# Patient Record
Sex: MALE | Race: WHITE | Employment: FULL TIME | URBAN - METROPOLITAN AREA
[De-identification: names, ages, dates, MRNs, and addresses within clinical notes are randomized per-mention and may not be internally consistent; named-entity substitution may affect disease eponyms.]

---

## 2018-07-08 ENCOUNTER — APPOINTMENT (OUTPATIENT)
Dept: ULTRASOUND IMAGING | Age: 38
End: 2018-07-08
Attending: EMERGENCY MEDICINE
Payer: COMMERCIAL

## 2018-07-08 ENCOUNTER — HOSPITAL ENCOUNTER (EMERGENCY)
Age: 38
Discharge: HOME | End: 2018-07-08
Attending: EMERGENCY MEDICINE
Payer: COMMERCIAL

## 2018-07-08 VITALS
SYSTOLIC BLOOD PRESSURE: 163 MMHG | RESPIRATION RATE: 20 BRPM | OXYGEN SATURATION: 96 % | TEMPERATURE: 98.5 F | WEIGHT: 203.93 LBS | DIASTOLIC BLOOD PRESSURE: 98 MMHG | HEART RATE: 89 BPM

## 2018-07-08 DIAGNOSIS — N45.1 EPIDIDYMITIS: Primary | ICD-10-CM

## 2018-07-08 PROCEDURE — 74011250637 HC RX REV CODE- 250/637: Performed by: EMERGENCY MEDICINE

## 2018-07-08 PROCEDURE — 96372 THER/PROPH/DIAG INJ SC/IM: CPT

## 2018-07-08 PROCEDURE — 74011000250 HC RX REV CODE- 250: Performed by: EMERGENCY MEDICINE

## 2018-07-08 PROCEDURE — 74011250636 HC RX REV CODE- 250/636: Performed by: EMERGENCY MEDICINE

## 2018-07-08 PROCEDURE — 99283 EMERGENCY DEPT VISIT LOW MDM: CPT

## 2018-07-08 PROCEDURE — 76870 US EXAM SCROTUM: CPT

## 2018-07-08 RX ORDER — OXYCODONE AND ACETAMINOPHEN 7.5; 325 MG/1; MG/1
1 TABLET ORAL
Status: COMPLETED | OUTPATIENT
Start: 2018-07-08 | End: 2018-07-08

## 2018-07-08 RX ORDER — OXYCODONE AND ACETAMINOPHEN 5; 325 MG/1; MG/1
1 TABLET ORAL
Qty: 10 TAB | Refills: 0 | Status: SHIPPED | OUTPATIENT
Start: 2018-07-08 | End: 2018-11-25

## 2018-07-08 RX ORDER — DOXYCYCLINE HYCLATE 100 MG
100 TABLET ORAL 2 TIMES DAILY
Qty: 20 TAB | Refills: 0 | Status: SHIPPED | OUTPATIENT
Start: 2018-07-08 | End: 2018-07-18

## 2018-07-08 RX ORDER — IBUPROFEN 800 MG/1
800 TABLET ORAL
Qty: 20 TAB | Refills: 0 | Status: SHIPPED | OUTPATIENT
Start: 2018-07-08 | End: 2018-07-15

## 2018-07-08 RX ORDER — KETOROLAC TROMETHAMINE 30 MG/ML
60 INJECTION, SOLUTION INTRAMUSCULAR; INTRAVENOUS ONCE
Status: COMPLETED | OUTPATIENT
Start: 2018-07-08 | End: 2018-07-08

## 2018-07-08 RX ADMIN — OXYCODONE HYDROCHLORIDE AND ACETAMINOPHEN 1 TABLET: 7.5; 325 TABLET ORAL at 23:14

## 2018-07-08 RX ADMIN — LIDOCAINE HYDROCHLORIDE 250 MG: 10 INJECTION, SOLUTION EPIDURAL; INFILTRATION; INTRACAUDAL; PERINEURAL at 23:15

## 2018-07-08 RX ADMIN — OXYCODONE HYDROCHLORIDE AND ACETAMINOPHEN 1 TABLET: 7.5; 325 TABLET ORAL at 22:11

## 2018-07-08 RX ADMIN — KETOROLAC TROMETHAMINE 60 MG: 30 INJECTION, SOLUTION INTRAMUSCULAR at 21:20

## 2018-07-08 NOTE — LETTER
[facility] EMERGENCY DEPT 
[address] [facility] [address] [id number] 
[phone] Work/School Note Date: 7/8/2018 To Whom It May concern: 
 
Florinda Don was seen and treated today in the emergency room by the following provider(s): 
Attending Provider: Nora Villegas MD. Florinda Don may return to work on 7/11/18. Sincerely, Nora Villegas MD

## 2018-07-09 NOTE — ED PROVIDER NOTES
HPI Comments: Hx renal transplant; presents with a one day hx of right testicular pain and swelling; first noted pain last night; lasted about an hour and resolved; today pain has been constant and he has noted swelling; pain is moderate; ice without relief. No dysuria. He has been constipated which is unusual for him. He denies a hx of similar testicular complaints. Good PO intake. Patient is a 40 y.o. male presenting with testicular pain. Testicle Pain             History reviewed. No pertinent past medical history. Past Surgical History:   Procedure Laterality Date    HX RENAL TRANSPLANT           History reviewed. No pertinent family history. Social History     Social History    Marital status: LEGALLY      Spouse name: N/A    Number of children: N/A    Years of education: N/A     Occupational History    Not on file. Social History Main Topics    Smoking status: Never Smoker    Smokeless tobacco: Never Used    Alcohol use Not on file    Drug use: Not on file    Sexual activity: Not on file     Other Topics Concern    Not on file     Social History Narrative    No narrative on file         ALLERGIES: Review of patient's allergies indicates no known allergies. Review of Systems   Genitourinary: Positive for testicular pain. All other systems reviewed and are negative. Vitals:    07/08/18 2055   BP: (!) 170/113   Pulse: 98   Resp: 20   Temp: 98.5 °F (36.9 °C)   SpO2: 99%   Weight: 92.5 kg (203 lb 14.8 oz)            Physical Exam   Constitutional: He appears well-developed and well-nourished. HENT:   Head: Normocephalic and atraumatic. Eyes: Conjunctivae are normal. No scleral icterus. Neck: No JVD present. No tracheal deviation present. Cardiovascular: Normal rate. Pulmonary/Chest: Effort normal.   Abdominal: He exhibits no distension. Genitourinary:   Genitourinary Comments: Swollen, tender left scrotum and underlying tissue. No testicular lie. Musculoskeletal: He exhibits no edema. Neurological: He is alert. oriented   Skin: No rash noted. No pallor. Psychiatric: He has a normal mood and affect. [facility]      ED Course       Procedures    Progress Note:  Results, treatment, and follow up plan have been discussed with patient/girlfriend  Questions were answered. Chelsea King MD  11:06 PM    A/P: epididymitis - Rocephin/Doxy; Percocet/Motrin for pain; PCP/urology f/u.   Chelsea King MD  11:10 PM

## 2018-07-09 NOTE — ED NOTES
Patient stated that his pain is terrible and that the last pill did not help. Asked him to void and he said that he cannot.

## 2018-07-09 NOTE — ED NOTES
Discharge note: The patient was discharged home in stable condition, accompanied by friend. The patient is alert and oriented, is in no respiratory distress and has vital signs within normal limits. The patient's diagnosis, condition and treatment were explained to patient by Dr Anya Kahn. The patient  expressed understanding of discharge instructions, prescriptions, and plan of care. A work note was given to pt. A discharge plan has been developed. A  was not involved in the process. Patient offered a wheelchair to ED lobby for discharge but declined at this time. Patient ambulatory with a steady gate to ED lobby to go home with friend.

## 2018-07-09 NOTE — ED NOTES
Patient's pain has intensified since returning from the [address]. Wife has asked for more pain medication. Will inform Dr Morris Pedraza.

## 2018-07-09 NOTE — ED TRIAGE NOTES
Patient complains of right side testicular swelling and pain that started yesterday. Patient reports pain and swelling increasing into right side of groin.

## 2018-07-09 NOTE — ED NOTES
Bedside shift change report given to Landon Camacho RN (oncoming nurse) by KAREN ADAM (offgoing nurse). Report included the following information SBAR.

## 2018-07-09 NOTE — DISCHARGE INSTRUCTIONS
Epididymitis and Orchitis: Care Instructions  Your Care Instructions    Epididymitis is pain and swelling of the tube that is attached to each testicle. This tube is called the epididymis. Orchitis is pain and swelling of the testicle. Infection with bacteria often causes these conditions. Sexually transmitted infections (STIs) also can cause both conditions. This is often the case in men younger than 28. Other causes in boys and older men are infections from surgery or having a catheter that drains urine. The mumps virus also can cause orchitis. Anti-inflammatory or pain medicines can help with the pain. Antibiotics are used if the problem is caused by bacteria. They are not used if a virus is the cause. Your testicle may stay swollen for many days or even a few weeks. The doctor has checked you carefully, but problems can develop later. If you notice any problems or new symptoms, get medical treatment right away. Follow-up care is a key part of your treatment and safety. Be sure to make and go to all appointments, and call your doctor if you are having problems. It's also a good idea to know your test results and keep a list of the medicines you take. How can you care for yourself at home? · If your doctor prescribed antibiotics, take them as directed. Do not stop taking them just because you feel better. You need to take the full course of antibiotics. · Ask your doctor if you can take an over-the-counter pain medicine, such as acetaminophen (Tylenol), ibuprofen (Advil, Motrin), or naproxen (Aleve). Be safe with medicines. Read and follow all instructions on the label. · Limit your activity to what is comfortable. · Wear snug underwear or an athletic supporter. This can help reduce pain. · Apply either cold or heat to the swollen area. Use the one that works best for your pain. Sitting in a warm bath for 15 minutes twice a day will help reduce the swelling more quickly.   · If you have been told that an STI may have caused your condition, do not have sex until your doctor says it is safe. This will prevent spreading the infection. Tell your sex partner or partners that they need to be checked. They may need treatment. When should you call for help? Call your doctor now or seek immediate medical care if:  ? · Your pain gets worse. ? · You have a new or higher fever. ? · You have new or more swelling of your testicle. ? · You have new belly pain, or your pain gets worse. ? Watch closely for changes in your health, and be sure to contact your doctor if:  ? · You do not get better as expected. Where can you learn more? Go to http://renate-alexa.info/. Enter S360 in the search box to learn more about \"Epididymitis and Orchitis: Care Instructions. \"  Current as of: May 12, 2017  Content Version: 11.4  © 5189-8120 [employer]. Care instructions adapted under license by [employer] (which disclaims liability or warranty for this information). If you have questions about a medical condition or this instruction, always ask your healthcare professional. [address] any warranty or liability for your use of this information.

## 2018-09-01 ENCOUNTER — HOSPITAL ENCOUNTER (EMERGENCY)
Age: 38
Discharge: HOME | End: 2018-09-01
Attending: EMERGENCY MEDICINE | Admitting: EMERGENCY MEDICINE
Payer: COMMERCIAL

## 2018-09-01 ENCOUNTER — APPOINTMENT (OUTPATIENT)
Dept: CT IMAGING | Age: 38
End: 2018-09-01
Attending: EMERGENCY MEDICINE
Payer: COMMERCIAL

## 2018-09-01 VITALS
OXYGEN SATURATION: 100 % | WEIGHT: 204.81 LBS | RESPIRATION RATE: 15 BRPM | SYSTOLIC BLOOD PRESSURE: 132 MMHG | HEART RATE: 85 BPM | HEIGHT: 66 IN | TEMPERATURE: 97.9 F | DIASTOLIC BLOOD PRESSURE: 91 MMHG | BODY MASS INDEX: 32.92 KG/M2

## 2018-09-01 DIAGNOSIS — K59.00 CONSTIPATION, UNSPECIFIED CONSTIPATION TYPE: ICD-10-CM

## 2018-09-01 DIAGNOSIS — R10.9 RIGHT FLANK PAIN: Primary | ICD-10-CM

## 2018-09-01 LAB
ALBUMIN SERPL-MCNC: 3.6 G/DL (ref 3.5–5)
ALBUMIN/GLOB SERPL: 1.1 {RATIO} (ref 1.1–2.2)
ALP SERPL-CCNC: 47 U/L (ref 45–117)
ALT SERPL-CCNC: 49 U/L (ref 12–78)
ANION GAP SERPL CALC-SCNC: 9 MMOL/L (ref 5–15)
APPEARANCE UR: CLEAR
AST SERPL-CCNC: 22 U/L (ref 15–37)
BACTERIA URNS QL MICRO: NEGATIVE /HPF
BASOPHILS # BLD: 0 K/UL (ref 0–0.1)
BASOPHILS NFR BLD: 1 % (ref 0–1)
BILIRUB SERPL-MCNC: 0.4 MG/DL (ref 0.2–1)
BILIRUB UR QL: NEGATIVE
BUN SERPL-MCNC: 14 MG/DL (ref 6–20)
BUN/CREAT SERPL: 11 (ref 12–20)
CALCIUM SERPL-MCNC: 8.8 MG/DL (ref 8.5–10.1)
CHLORIDE SERPL-SCNC: 103 MMOL/L (ref 97–108)
CO2 SERPL-SCNC: 29 MMOL/L (ref 21–32)
COLOR UR: ABNORMAL
CREAT SERPL-MCNC: 1.25 MG/DL (ref 0.7–1.3)
DIFFERENTIAL METHOD BLD: NORMAL
EOSINOPHIL # BLD: 0.3 K/UL (ref 0–0.4)
EOSINOPHIL NFR BLD: 5 % (ref 0–7)
EPITH CASTS URNS QL MICRO: NORMAL /LPF
ERYTHROCYTE [DISTWIDTH] IN BLOOD BY AUTOMATED COUNT: 11.8 % (ref 11.5–14.5)
GLOBULIN SER CALC-MCNC: 3.2 G/DL (ref 2–4)
GLUCOSE SERPL-MCNC: 96 MG/DL (ref 65–100)
GLUCOSE UR STRIP.AUTO-MCNC: NEGATIVE MG/DL
HCT VFR BLD AUTO: 44.4 % (ref 36.6–50.3)
HGB BLD-MCNC: 15.6 G/DL (ref 12.1–17)
HGB UR QL STRIP: ABNORMAL
KETONES UR QL STRIP.AUTO: NEGATIVE MG/DL
LEUKOCYTE ESTERASE UR QL STRIP.AUTO: NEGATIVE
LYMPHOCYTES # BLD: 1.8 K/UL
LYMPHOCYTES NFR BLD: 27 % (ref 12–49)
MCH RBC QN AUTO: 33 PG (ref 26–34)
MCHC RBC AUTO-ENTMCNC: 35.1 G/DL (ref 30–36.5)
MCV RBC AUTO: 93.9 FL (ref 80–99)
MONOCYTES # BLD: 0.6 K/UL (ref 0–1)
MONOCYTES NFR BLD: 9 % (ref 5–13)
NEUTS SEG # BLD: 3.9 K/UL (ref 1.8–8)
NEUTS SEG NFR BLD: 58 % (ref 32–75)
NITRITE UR QL STRIP.AUTO: NEGATIVE
PH UR STRIP: 6 [PH] (ref 5–8)
PLATELET # BLD AUTO: 236 K/UL (ref 150–400)
PMV BLD AUTO: 9 FL (ref 8.9–12.9)
POTASSIUM SERPL-SCNC: 4.1 MMOL/L (ref 3.5–5.1)
PROT SERPL-MCNC: 6.8 G/DL (ref 6.4–8.2)
PROT UR STRIP-MCNC: NEGATIVE MG/DL
RBC # BLD AUTO: 4.73 M/UL (ref 4.1–5.7)
RBC #/AREA URNS HPF: NORMAL /HPF (ref 0–5)
SODIUM SERPL-SCNC: 141 MMOL/L (ref 136–145)
SP GR UR REFRACTOMETRY: 1.02 (ref 1–1.03)
UROBILINOGEN UR QL STRIP.AUTO: 0.2 EU/DL (ref 0.2–1)
WBC # BLD AUTO: 6.6 K/UL (ref 4.1–11.1)
WBC URNS QL MICRO: NORMAL /HPF (ref 0–4)
XXWBCSUS: 0

## 2018-09-01 PROCEDURE — 99283 EMERGENCY DEPT VISIT LOW MDM: CPT

## 2018-09-01 PROCEDURE — 85025 COMPLETE CBC W/AUTO DIFF WBC: CPT | Performed by: EMERGENCY MEDICINE

## 2018-09-01 PROCEDURE — 80053 COMPREHEN METABOLIC PANEL: CPT | Performed by: EMERGENCY MEDICINE

## 2018-09-01 PROCEDURE — 74011250636 HC RX REV CODE- 250/636: Performed by: EMERGENCY MEDICINE

## 2018-09-01 PROCEDURE — 81001 URINALYSIS AUTO W/SCOPE: CPT | Performed by: EMERGENCY MEDICINE

## 2018-09-01 PROCEDURE — 74176 CT ABD & PELVIS W/O CONTRAST: CPT

## 2018-09-01 PROCEDURE — 36415 COLL VENOUS BLD VENIPUNCTURE: CPT | Performed by: EMERGENCY MEDICINE

## 2018-09-01 RX ORDER — POLYETHYLENE GLYCOL 3350 17 G/17G
17 POWDER, FOR SOLUTION ORAL DAILY
Qty: 238 G | Refills: 0 | Status: SHIPPED | OUTPATIENT
Start: 2018-09-01 | End: 2018-11-25

## 2018-09-01 RX ADMIN — SODIUM CHLORIDE 1000 ML: 900 INJECTION, SOLUTION INTRAVENOUS at 01:40

## 2018-09-01 NOTE — ED PROVIDER NOTES
HPI Comments:  
66-year-old male with history of kidney stones presents with complaints of 2-1/2 hours right flank pain radiating into right groin associated with nausea. Denies trauma. Feel similar to kidney stone in past. Denies fever, chills. Associated with recent constipation. Pain started with bowel movement this evening. Denies urinary complaints, hematuria, dysuria, urinary frequency/urgency. No pain medications taken prior to arrival. Patient currently has only one kidney, left kidney donated. Denies drug use, tobacco use Daily alcohol use No primary care physician Has seen at [facility] urology in past for kidney stones The history is provided by the patient. History reviewed. No pertinent past medical history. Past Surgical History:  
Procedure Laterality Date  HX RENAL TRANSPLANT History reviewed. No pertinent family history. Social History Social History  Marital status: LEGALLY  Spouse name: N/A  
 Number of children: N/A  
 Years of education: N/A Occupational History  Not on file. Social History Main Topics  Smoking status: Never Smoker  Smokeless tobacco: Never Used  Alcohol use Not on file  Drug use: Not on file  Sexual activity: Not on file Other Topics Concern  Not on file Social History Narrative ALLERGIES: Review of patient's allergies indicates no known allergies. Review of Systems Constitutional: Negative for chills and fever. HENT: Negative for congestion, nosebleeds and sore throat. Eyes: Negative for pain and discharge. Respiratory: Negative for cough and shortness of breath. Cardiovascular: Negative for chest pain and palpitations. Gastrointestinal: Positive for constipation and nausea. Negative for abdominal pain and vomiting. Genitourinary: Positive for flank pain. Negative for decreased urine volume, dysuria and urgency. Musculoskeletal: Negative for gait problem and myalgias. Skin: Negative for rash and wound. Neurological: Negative for seizures and syncope. Hematological: Does not bruise/bleed easily. Psychiatric/Behavioral: Negative for confusion, self-injury and suicidal ideas. Vitals:  
 09/01/18 0124 BP: (!) 156/92 Pulse: 97 Resp: 16 Temp: 97.9 °F (36.6 °C) SpO2: 98% Weight: 92.9 kg (204 lb 12.9 oz) Height: 5' 6\" (1.676 m) Physical Exam  
Constitutional: He is oriented to person, place, and time. He appears well-developed and well-nourished. HENT:  
Head: Normocephalic and atraumatic. Eyes: EOM are normal. Pupils are equal, round, and reactive to light. Neck: Normal range of motion. Neck supple. Cardiovascular: Normal rate, regular rhythm, normal heart sounds and intact distal pulses. Pulmonary/Chest: Effort normal and breath sounds normal. No respiratory distress. He has no wheezes. Abdominal: Soft. Bowel sounds are normal. There is no tenderness. There is no rebound and no guarding. Hernia confirmed negative in the right inguinal area and confirmed negative in the left inguinal area. Genitourinary: Penis normal. Right testis shows tenderness. Right testis shows no mass and no swelling. Right testis is descended. Left testis shows no mass, no swelling and no tenderness. Left testis is descended. Circumcised. No penile tenderness. Musculoskeletal: Normal range of motion. Neurological: He is alert and oriented to person, place, and time. Skin: Skin is warm and dry. Psychiatric: He has a normal mood and affect. His behavior is normal.  
Nursing note and vitals reviewed. MDM Number of Diagnoses or Management Options Constipation, unspecified constipation type:  
Right flank pain:  
Diagnosis management comments: 43-year-old male with history of single kidney, kidney stones presents with complaints of right flank pain associated with nausea  is similar to stone pain in past. Also complains of constipation. Patient appears mildly uncomfortable, right flank tenderness to palpation, right sided groin tenderness to palpation, right scrotal mild tenderness. Plan-CBC/CMP, CT abdomen pelvis, and fluid hydration. Declines pain medication at this time. Labs and CT unremarkable Amount and/or Complexity of Data Reviewed Clinical lab tests: ordered and reviewed Tests in the radiology section of CPT®: ordered and reviewed Independent visualization of images, tracings, or specimens: yes Patient Progress Patient progress: improved ED Course Procedures

## 2018-09-01 NOTE — DISCHARGE INSTRUCTIONS
Abdominal Pain: Care Instructions  Your Care Instructions    Abdominal pain has many possible causes. Some aren't serious and get better on their own in a few days. Others need more testing and treatment. If your pain continues or gets worse, you need to be rechecked and may need more tests to find out what is wrong. You may need surgery to correct the problem. Don't ignore new symptoms, such as fever, nausea and vomiting, urination problems, pain that gets worse, and dizziness. These may be signs of a more serious problem. Your doctor may have recommended a follow-up visit in the next 8 to 12 hours. If you are not getting better, you may need more tests or treatment. The doctor has checked you carefully, but problems can develop later. If you notice any problems or new symptoms, get medical treatment right away. Follow-up care is a key part of your treatment and safety. Be sure to make and go to all appointments, and call your doctor if you are having problems. It's also a good idea to know your test results and keep a list of the medicines you take. How can you care for yourself at home? · Rest until you feel better. · To prevent dehydration, drink plenty of fluids, enough so that your urine is light yellow or clear like water. Choose water and other caffeine-free clear liquids until you feel better. If you have kidney, heart, or liver disease and have to limit fluids, talk with your doctor before you increase the amount of fluids you drink. · If your stomach is upset, eat mild foods, such as rice, dry toast or crackers, bananas, and applesauce. Try eating several small meals instead of two or three large ones. · Wait until 48 hours after all symptoms have gone away before you have spicy foods, alcohol, and drinks that contain caffeine. · Do not eat foods that are high in fat. · Avoid anti-inflammatory medicines such as aspirin, ibuprofen (Advil, Motrin), and naproxen (Aleve).  These can cause stomach upset. Talk to your doctor if you take daily aspirin for another health problem. When should you call for help? Call 911 anytime you think you may need emergency care. For example, call if:    · You passed out (lost consciousness).     · You pass maroon or very bloody stools.     · You vomit blood or what looks like coffee grounds.     · You have new, severe belly pain.    Call your doctor now or seek immediate medical care if:    · Your pain gets worse, especially if it becomes focused in one area of your belly.     · You have a new or higher fever.     · Your stools are black and look like tar, or they have streaks of blood.     · You have unexpected vaginal bleeding.     · You have symptoms of a urinary tract infection. These may include:  ¨ Pain when you urinate. ¨ Urinating more often than usual.  ¨ Blood in your urine.     · You are dizzy or lightheaded, or you feel like you may faint.    Watch closely for changes in your health, and be sure to contact your doctor if:    · You are not getting better after 1 day (24 hours). Where can you learn more? Go to http://renaterSmartalexa.info/. Enter A263 in the search box to learn more about \"Abdominal Pain: Care Instructions. \"  Current as of: November 20, 2017  Content Version: 11.7  © 2742-0744 [employer]. Care instructions adapted under license by [employer] (which disclaims liability or warranty for this information). If you have questions about a medical condition or this instruction, always ask your healthcare professional. [place] [address] any warranty or liability for your use of this information. Constipation: Care Instructions  Your Care Instructions    Constipation means that you have a hard time passing stools (bowel movements). People pass stools from 3 times a day to once every 3 days. What is normal for you may be different.  Constipation may occur with pain in the rectum and cramping. The pain may get worse when you try to pass stools. Sometimes there are small amounts of bright red blood on toilet paper or the surface of stools. This is because of enlarged veins near the rectum (hemorrhoids). A few changes in your diet and lifestyle may help you avoid ongoing constipation. Your doctor may also prescribe medicine to help loosen your stool. Some medicines can cause constipation. These include pain medicines and antidepressants. Tell your doctor about all the medicines you take. Your doctor may want to make a medicine change to ease your symptoms. Follow-up care is a key part of your treatment and safety. Be sure to make and go to all appointments, and call your doctor if you are having problems. It's also a good idea to know your test results and keep a list of the medicines you take. How can you care for yourself at home? · Drink plenty of fluids, enough so that your urine is light yellow or clear like water. If you have kidney, heart, or liver disease and have to limit fluids, talk with your doctor before you increase the amount of fluids you drink. · Include high-fiber foods in your diet each day. These include fruits, vegetables, beans, and whole grains. · Get at least 30 minutes of exercise on most days of the week. Walking is a good choice. You also may want to do other activities, such as running, swimming, cycling, or playing tennis or team sports. · Take a fiber supplement, such as Citrucel or Metamucil, every day. Read and follow all instructions on the label. · Schedule time each day for a bowel movement. A daily routine may help. Take your time having your bowel movement. · Support your feet with a small step stool when you sit on the toilet. This helps flex your hips and places your pelvis in a squatting position. · Your doctor may recommend an over-the-counter laxative to relieve your constipation. Examples are Milk of Magnesia and MiraLax.  Read and follow all instructions on the label. Do not use laxatives on a long-term basis. When should you call for help? Call your doctor now or seek immediate medical care if:    · You have new or worse belly pain.     · You have new or worse nausea or vomiting.     · You have blood in your stools.    Watch closely for changes in your health, and be sure to contact your doctor if:    · Your constipation is getting worse.     · You do not get better as expected. Where can you learn more? Go to http://renate-alexa.info/. Enter 21 [id number] in the search box to learn more about \"Constipation: Care Instructions. \"  Current as of: November 20, 2017  Content Version: 11.7  © 5714-7411 [employer]. Care instructions adapted under license by [employer] (which disclaims liability or warranty for this information). If you have questions about a medical condition or this instruction, always ask your healthcare professional. [place] [address] any warranty or liability for your use of this information.

## 2018-10-08 ENCOUNTER — HOSPITAL ENCOUNTER (EMERGENCY)
Age: 38
Discharge: HOME | End: 2018-10-08
Attending: EMERGENCY MEDICINE
Payer: COMMERCIAL

## 2018-10-08 ENCOUNTER — APPOINTMENT (OUTPATIENT)
Dept: GENERAL RADIOLOGY | Age: 38
End: 2018-10-08
Attending: EMERGENCY MEDICINE
Payer: COMMERCIAL

## 2018-10-08 VITALS
HEIGHT: 67 IN | WEIGHT: 200 LBS | HEART RATE: 91 BPM | OXYGEN SATURATION: 100 % | DIASTOLIC BLOOD PRESSURE: 104 MMHG | TEMPERATURE: 97.7 F | RESPIRATION RATE: 16 BRPM | BODY MASS INDEX: 31.39 KG/M2 | SYSTOLIC BLOOD PRESSURE: 136 MMHG

## 2018-10-08 DIAGNOSIS — R10.31 RLQ ABDOMINAL PAIN: Primary | ICD-10-CM

## 2018-10-08 LAB
ALBUMIN SERPL-MCNC: 4.3 G/DL (ref 3.5–5)
ALBUMIN/GLOB SERPL: 1.2 {RATIO} (ref 1.1–2.2)
ALP SERPL-CCNC: 57 U/L (ref 45–117)
ALT SERPL-CCNC: 54 U/L (ref 12–78)
ANION GAP SERPL CALC-SCNC: 6 MMOL/L (ref 5–15)
APPEARANCE UR: CLEAR
AST SERPL-CCNC: 33 U/L (ref 15–37)
BACTERIA URNS QL MICRO: NEGATIVE /HPF
BASOPHILS # BLD: 0 K/UL (ref 0–0.1)
BASOPHILS NFR BLD: 1 % (ref 0–1)
BILIRUB SERPL-MCNC: 0.6 MG/DL (ref 0.2–1)
BILIRUB UR QL: NEGATIVE
BUN SERPL-MCNC: 19 MG/DL (ref 6–20)
BUN/CREAT SERPL: 17 (ref 12–20)
CALCIUM SERPL-MCNC: 9 MG/DL (ref 8.5–10.1)
CHLORIDE SERPL-SCNC: 103 MMOL/L (ref 97–108)
CO2 SERPL-SCNC: 31 MMOL/L (ref 21–32)
COLOR UR: ABNORMAL
COMMENT, HOLDF: NORMAL
CREAT SERPL-MCNC: 1.13 MG/DL (ref 0.7–1.3)
DIFFERENTIAL METHOD BLD: ABNORMAL
EOSINOPHIL # BLD: 0.2 K/UL (ref 0–0.4)
EOSINOPHIL NFR BLD: 3 % (ref 0–7)
EPITH CASTS URNS QL MICRO: ABNORMAL /LPF
ERYTHROCYTE [DISTWIDTH] IN BLOOD BY AUTOMATED COUNT: 11.4 % (ref 11.5–14.5)
GLOBULIN SER CALC-MCNC: 3.6 G/DL (ref 2–4)
GLUCOSE SERPL-MCNC: 103 MG/DL (ref 65–100)
GLUCOSE UR STRIP.AUTO-MCNC: NEGATIVE MG/DL
HCT VFR BLD AUTO: 44.1 % (ref 36.6–50.3)
HGB BLD-MCNC: 15.3 G/DL (ref 12.1–17)
HGB UR QL STRIP: ABNORMAL
HYALINE CASTS URNS QL MICRO: ABNORMAL /LPF (ref 0–5)
IMM GRANULOCYTES # BLD: 0 K/UL (ref 0–0.04)
IMM GRANULOCYTES NFR BLD AUTO: 1 % (ref 0–0.5)
KETONES UR QL STRIP.AUTO: NEGATIVE MG/DL
LEUKOCYTE ESTERASE UR QL STRIP.AUTO: NEGATIVE
LIPASE SERPL-CCNC: 175 U/L (ref 73–393)
LYMPHOCYTES # BLD: 1.7 K/UL (ref 0.8–3.5)
LYMPHOCYTES NFR BLD: 19 % (ref 12–49)
MCH RBC QN AUTO: 32 PG (ref 26–34)
MCHC RBC AUTO-ENTMCNC: 34.7 G/DL (ref 30–36.5)
MCV RBC AUTO: 92.3 FL (ref 80–99)
MONOCYTES # BLD: 0.5 K/UL (ref 0–1)
MONOCYTES NFR BLD: 5 % (ref 5–13)
NEUTS SEG # BLD: 6.4 K/UL (ref 1.8–8)
NEUTS SEG NFR BLD: 72 % (ref 32–75)
NITRITE UR QL STRIP.AUTO: NEGATIVE
NRBC # BLD: 0 K/UL (ref 0–0.01)
NRBC BLD-RTO: 0 PER 100 WBC
PH UR STRIP: 5 [PH] (ref 5–8)
PLATELET # BLD AUTO: 281 K/UL (ref 150–400)
PMV BLD AUTO: 8.8 FL (ref 8.9–12.9)
POTASSIUM SERPL-SCNC: 4.2 MMOL/L (ref 3.5–5.1)
PROT SERPL-MCNC: 7.9 G/DL (ref 6.4–8.2)
PROT UR STRIP-MCNC: NEGATIVE MG/DL
RBC # BLD AUTO: 4.78 M/UL (ref 4.1–5.7)
RBC #/AREA URNS HPF: ABNORMAL /HPF (ref 0–5)
SAMPLES BEING HELD,HOLD: NORMAL
SODIUM SERPL-SCNC: 140 MMOL/L (ref 136–145)
SP GR UR REFRACTOMETRY: 1.03 (ref 1–1.03)
UR CULT HOLD, URHOLD: NORMAL
UROBILINOGEN UR QL STRIP.AUTO: 0.2 EU/DL (ref 0.2–1)
WBC # BLD AUTO: 8.9 K/UL (ref 4.1–11.1)
WBC URNS QL MICRO: ABNORMAL /HPF (ref 0–4)

## 2018-10-08 PROCEDURE — 36415 COLL VENOUS BLD VENIPUNCTURE: CPT | Performed by: EMERGENCY MEDICINE

## 2018-10-08 PROCEDURE — 85025 COMPLETE CBC W/AUTO DIFF WBC: CPT | Performed by: EMERGENCY MEDICINE

## 2018-10-08 PROCEDURE — 96361 HYDRATE IV INFUSION ADD-ON: CPT

## 2018-10-08 PROCEDURE — 81001 URINALYSIS AUTO W/SCOPE: CPT | Performed by: EMERGENCY MEDICINE

## 2018-10-08 PROCEDURE — 99283 EMERGENCY DEPT VISIT LOW MDM: CPT

## 2018-10-08 PROCEDURE — 96360 HYDRATION IV INFUSION INIT: CPT

## 2018-10-08 PROCEDURE — 73502 X-RAY EXAM HIP UNI 2-3 VIEWS: CPT

## 2018-10-08 PROCEDURE — 83690 ASSAY OF LIPASE: CPT | Performed by: EMERGENCY MEDICINE

## 2018-10-08 PROCEDURE — 74011250636 HC RX REV CODE- 250/636: Performed by: EMERGENCY MEDICINE

## 2018-10-08 PROCEDURE — 80053 COMPREHEN METABOLIC PANEL: CPT | Performed by: EMERGENCY MEDICINE

## 2018-10-08 PROCEDURE — 74019 RADEX ABDOMEN 2 VIEWS: CPT

## 2018-10-08 RX ORDER — SODIUM CHLORIDE 0.9 % (FLUSH) 0.9 %
5-10 SYRINGE (ML) INJECTION AS NEEDED
Status: DISCONTINUED | OUTPATIENT
Start: 2018-10-08 | End: 2018-10-08 | Stop reason: HOSPADM

## 2018-10-08 RX ORDER — SODIUM CHLORIDE 0.9 % (FLUSH) 0.9 %
5-10 SYRINGE (ML) INJECTION EVERY 8 HOURS
Status: DISCONTINUED | OUTPATIENT
Start: 2018-10-08 | End: 2018-10-08 | Stop reason: HOSPADM

## 2018-10-08 RX ADMIN — SODIUM CHLORIDE 1000 ML: 900 INJECTION, SOLUTION INTRAVENOUS at 06:00

## 2018-10-08 NOTE — ED PROVIDER NOTES
HPI Comments: Patient arrives for complaints of RLQ abd pain that radiates to right lower back. Patient states this happens after attempting to have bowel movements for 4-5 months. Patient has not followed up with PCP but rather had other ER visits in which he was told he was constipated. Patient is a 40 y.o. male presenting with abdominal pain. The history is provided by the patient. No  was used. Abdominal Pain This is a recurrent problem. The current episode started 1 to 2 hours ago. The problem occurs every several days. The problem has been gradually improving. Associated with: bowel movements. The pain is located in the RLQ. The quality of the pain is sharp. The pain is at a severity of 4/10. Associated symptoms include back pain. Pertinent negatives include no fever, no vomiting, no dysuria, no hematuria, no myalgias and no chest pain. The pain is worsened by palpation and bowel movements. Relieved by: rest. Past workup includes CT scan. His past medical history does not include PUD, GERD, UTI or DM. History reviewed. No pertinent past medical history. Past Surgical History:  
Procedure Laterality Date  HX RENAL TRANSPLANT  HX UROLOGICAL    
 left kidney removed  - donated  HX VASECTOMY History reviewed. No pertinent family history. Social History Social History  Marital status: LEGALLY  Spouse name: N/A  
 Number of children: N/A  
 Years of education: N/A Occupational History  Not on file. Social History Main Topics  Smoking status: Never Smoker  Smokeless tobacco: Never Used  Alcohol use 21.6 oz/week 36 Cans of beer per week  Drug use: No  
 Sexual activity: Not on file Other Topics Concern  Not on file Social History Narrative ALLERGIES: Review of patient's allergies indicates no known allergies. Review of Systems Constitutional: Negative for appetite change, chills, fever and unexpected weight change. HENT: Negative for ear pain, hearing loss, rhinorrhea and trouble swallowing. Eyes: Negative for pain and visual disturbance. Respiratory: Negative for cough, chest tightness and shortness of breath. Cardiovascular: Negative for chest pain and palpitations. Gastrointestinal: Positive for abdominal pain. Negative for abdominal distention, blood in stool and vomiting. Genitourinary: Negative for dysuria, hematuria and urgency. Musculoskeletal: Positive for back pain. Negative for myalgias. Skin: Negative for rash. Neurological: Negative for dizziness, syncope, weakness and numbness. Psychiatric/Behavioral: Negative for confusion and suicidal ideas. All other systems reviewed and are negative. Vitals:  
 10/08/18 0544 BP: (!) 150/94 Pulse: 91  
Resp: 16 Temp: 97.7 °F (36.5 °C) SpO2: 100% Weight: 90.7 kg (200 lb) Height: 5' 7\" (1.702 m) Physical Exam  
Constitutional: He is oriented to person, place, and time. He appears well-developed and well-nourished. No distress. HENT:  
Head: Normocephalic and atraumatic. Right Ear: External ear normal.  
Left Ear: External ear normal.  
Nose: Nose normal.  
Mouth/Throat: Oropharynx is clear and moist. No oropharyngeal exudate. Eyes: Conjunctivae and EOM are normal. Pupils are equal, round, and reactive to light. Right eye exhibits no discharge. Left eye exhibits no discharge. No scleral icterus. Neck: Normal range of motion. Neck supple. No JVD present. No tracheal deviation present. Cardiovascular: Normal rate, regular rhythm, normal heart sounds and intact distal pulses. Exam reveals no gallop and no friction rub. No murmur heard. Pulmonary/Chest: Effort normal and breath sounds normal. No stridor. No respiratory distress. He has no decreased breath sounds.  He has no wheezes. He has no rhonchi. He has no rales. He exhibits no tenderness. Abdominal: Soft. Bowel sounds are normal. He exhibits no distension. There is tenderness (mild) in the right lower quadrant. There is no rebound, no guarding and no tenderness at McBurney's point. Hernia confirmed negative in the left inguinal area. Genitourinary: Testes normal and penis normal.  
 
 
Cremasteric reflex is present. Right testis shows no mass, no swelling and no tenderness. Right testis is descended. Cremasteric reflex is not absent on the right side. Left testis shows no mass, no swelling and no tenderness. Left testis is descended. Cremasteric reflex is not absent on the left side. Circumcised. Musculoskeletal: Normal range of motion. He exhibits no edema. Right hip: He exhibits tenderness. Legs: 
Neurological: He is alert and oriented to person, place, and time. He has normal strength and normal reflexes. No cranial nerve deficit or sensory deficit. He exhibits normal muscle tone. Coordination normal. GCS eye subscore is 4. GCS verbal subscore is 5. GCS motor subscore is 6. Skin: Skin is warm and dry. No rash noted. He is not diaphoretic. No erythema. No pallor. Psychiatric: He has a normal mood and affect. His behavior is normal. Judgment and thought content normal.  
Nursing note and vitals reviewed. MDM Number of Diagnoses or Management Options Amount and/or Complexity of Data Reviewed Clinical lab tests: ordered and reviewed Tests in the radiology section of CPT®: ordered and reviewed Risk of Complications, Morbidity, and/or Mortality Presenting problems: moderate Diagnostic procedures: low Management options: moderate Patient Progress Patient progress: stable ED Course Procedures Chief Complaint Patient presents with  Abdominal Pain  Back Pain The patient's presenting problems have been discussed, and they are in agreement with the care plan formulated and outlined with them. I have encouraged them to ask questions as they arise throughout their visit. MEDICATIONS GIVEN: 
Medications  
sodium chloride (NS) flush 5-10 mL (not administered)  
sodium chloride (NS) flush 5-10 mL (not administered)  
sodium chloride 0.9 % bolus infusion 1,000 mL (1,000 mL IntraVENous New Bag 10/8/18 0600) LABS REVIEWED: 
Recent Results (from the past 24 hour(s)) CBC WITH AUTOMATED DIFF Collection Time: 10/08/18  5:58 AM  
Result Value Ref Range WBC 8.9 4.1 - 11.1 K/uL  
 RBC 4.78 4.10 - 5.70 M/uL  
 HGB 15.3 12.1 - 17.0 g/dL HCT 44.1 36.6 - 50.3 % MCV 92.3 80.0 - 99.0 FL  
 MCH 32.0 26.0 - 34.0 PG  
 MCHC 34.7 30.0 - 36.5 g/dL  
 RDW 11.4 (L) 11.5 - 14.5 % PLATELET 535 998 - 090 K/uL MPV 8.8 (L) 8.9 - 12.9 FL  
 NRBC 0.0 0  WBC ABSOLUTE NRBC 0.00 0.00 - 0.01 K/uL NEUTROPHILS 72 32 - 75 % LYMPHOCYTES 19 12 - 49 % MONOCYTES 5 5 - 13 % EOSINOPHILS 3 0 - 7 % BASOPHILS 1 0 - 1 % IMMATURE GRANULOCYTES 1 (H) 0.0 - 0.5 % ABS. NEUTROPHILS 6.4 1.8 - 8.0 K/UL  
 ABS. LYMPHOCYTES 1.7 0.8 - 3.5 K/UL  
 ABS. MONOCYTES 0.5 0.0 - 1.0 K/UL  
 ABS. EOSINOPHILS 0.2 0.0 - 0.4 K/UL  
 ABS. BASOPHILS 0.0 0.0 - 0.1 K/UL  
 ABS. IMM. GRANS. 0.0 0.00 - 0.04 K/UL  
 DF AUTOMATED METABOLIC PANEL, COMPREHENSIVE Collection Time: 10/08/18  5:58 AM  
Result Value Ref Range Sodium 140 136 - 145 mmol/L Potassium 4.2 3.5 - 5.1 mmol/L Chloride 103 97 - 108 mmol/L  
 CO2 31 21 - 32 mmol/L Anion gap 6 5 - 15 mmol/L Glucose 103 (H) 65 - 100 mg/dL BUN 19 6 - 20 MG/DL Creatinine 1.13 0.70 - 1.30 MG/DL  
 BUN/Creatinine ratio 17 12 - 20 GFR est AA >60 >60 ml/min/1.73m2 GFR est non-AA >60 >60 ml/min/1.73m2 Calcium 9.0 8.5 - 10.1 MG/DL Bilirubin, total 0.6 0.2 - 1.0 MG/DL  
 ALT (SGPT) 54 12 - 78 U/L  
 AST (SGOT) 33 15 - 37 U/L Alk.  phosphatase 57 45 - 117 U/L  
 Protein, total 7.9 6.4 - 8.2 g/dL Albumin 4.3 3.5 - 5.0 g/dL Globulin 3.6 2.0 - 4.0 g/dL A-G Ratio 1.2 1.1 - 2.2 LIPASE Collection Time: 10/08/18  5:58 AM  
Result Value Ref Range Lipase 175 73 - 393 U/L  
URINALYSIS W/MICROSCOPIC Collection Time: 10/08/18  5:58 AM  
Result Value Ref Range Color YELLOW/STRAW Appearance CLEAR CLEAR Specific gravity 1.026 1.003 - 1.030    
 pH (UA) 5.0 5.0 - 8.0 Protein NEGATIVE  NEG mg/dL Glucose NEGATIVE  NEG mg/dL Ketone NEGATIVE  NEG mg/dL Bilirubin NEGATIVE  NEG Blood TRACE (A) NEG Urobilinogen 0.2 0.2 - 1.0 EU/dL Nitrites NEGATIVE  NEG Leukocyte Esterase NEGATIVE  NEG    
 WBC 0-4 0 - 4 /hpf  
 RBC 0-5 0 - 5 /hpf Epithelial cells FEW FEW /lpf Bacteria NEGATIVE  NEG /hpf Hyaline cast 0-2 0 - 5 /lpf URINE CULTURE HOLD SAMPLE Collection Time: 10/08/18  5:58 AM  
Result Value Ref Range Urine culture hold URINE ON HOLD IN MICROBIOLOGY DEPT FOR 3 DAYS. IF UNPRESERVED URINE IS SUBMITTED, IT CANNOT BE USED FOR ADDITIONAL TESTING AFTER 24 HRS, RECOLLECTION WILL BE REQUIRED. SAMPLES BEING HELD Collection Time: 10/08/18  5:58 AM  
Result Value Ref Range SAMPLES BEING HELD 1GRN,1BLU,1SST,1RED COMMENT Add-on orders for these samples will be processed based on acceptable specimen integrity and analyte stability, which may vary by analyte. VITAL SIGNS: 
Patient Vitals for the past 12 hrs: 
 Temp Pulse Resp BP SpO2  
10/08/18 0544 97.7 °F (36.5 °C) 91 16 (!) 150/94 100 % RADIOLOGY RESULTS: 
The following have been ordered and reviewed: 
No results found. PROGRESS NOTES: 
7:08 AM 
Change of shift. Care of patient signed over to Dr. Ayesha Ford. Bedside handoff complete. DIAGNOSIS: 
 
1. RLQ abdominal pain PLAN: 
Final disposition by oncoming physician. ED COURSE: The patient's hospital course has been uncomplicated.

## 2018-10-08 NOTE — ED NOTES
Bedside and Verbal shift change report given to Eloisa Hartman (oncoming nurse) by Dorinda Comer (offgoing nurse). Report included the following information SBAR, ED Summary, MAR and Recent Results.

## 2018-10-08 NOTE — ED TRIAGE NOTES
Patient arrives for complaints of RLQ abd pain that radiates to right lower back. Patient states this happens after attempting to have bowel movements for 4-5 months. Patient has not followed up with PCP but rather had other ER visits in which he was told he was constipated.

## 2018-10-08 NOTE — LETTER
[facility] EMERGENCY DEPT 
[address] [place] [address] [id number] [phone] Work/School Note Date: 10/8/2018 To Whom It May concern: 
 
Amaris Hope was seen and treated today in the emergency room by the following provider(s): 
Attending Provider: Marti Elizondo DO & Rin Hardy MD   
  
Amaris Hope may return to work on 10/9/2018.  
 
Sincerely, 
 
Rin Hardy MD

## 2018-10-08 NOTE — DISCHARGE INSTRUCTIONS

## 2018-11-25 ENCOUNTER — APPOINTMENT (OUTPATIENT)
Dept: CT IMAGING | Age: 38
DRG: 864 | End: 2018-11-25
Attending: EMERGENCY MEDICINE
Payer: COMMERCIAL

## 2018-11-25 ENCOUNTER — HOSPITAL ENCOUNTER (INPATIENT)
Age: 38
LOS: 4 days | Discharge: HOME | DRG: 864 | End: 2018-11-29
Attending: EMERGENCY MEDICINE | Admitting: HOSPITALIST
Payer: COMMERCIAL

## 2018-11-25 ENCOUNTER — APPOINTMENT (OUTPATIENT)
Dept: GENERAL RADIOLOGY | Age: 38
DRG: 864 | End: 2018-11-25
Attending: EMERGENCY MEDICINE
Payer: COMMERCIAL

## 2018-11-25 DIAGNOSIS — R50.9 FEVER, UNKNOWN ORIGIN: Primary | ICD-10-CM

## 2018-11-25 PROBLEM — R10.9 ABDOMINAL PAIN: Status: ACTIVE | Noted: 2018-11-25

## 2018-11-25 LAB
ALBUMIN SERPL-MCNC: 3.7 G/DL (ref 3.5–5)
ALBUMIN/GLOB SERPL: 1 {RATIO} (ref 1.1–2.2)
ALP SERPL-CCNC: 47 U/L (ref 45–117)
ALT SERPL-CCNC: 60 U/L (ref 12–78)
AMPHET UR QL SCN: POSITIVE
ANION GAP SERPL CALC-SCNC: 9 MMOL/L (ref 5–15)
APPEARANCE CSF: CLEAR
APPEARANCE UR: CLEAR
APTT PPP: 29.9 SEC (ref 22.1–32)
AST SERPL-CCNC: 40 U/L (ref 15–37)
BACTERIA URNS QL MICRO: NEGATIVE /HPF
BARBITURATES UR QL SCN: NEGATIVE
BASOPHILS # BLD: 0 K/UL (ref 0–0.1)
BASOPHILS NFR BLD: 0 % (ref 0–1)
BENZODIAZ UR QL: NEGATIVE
BILIRUB SERPL-MCNC: 0.9 MG/DL (ref 0.2–1)
BILIRUB UR QL: NEGATIVE
BUN SERPL-MCNC: 11 MG/DL (ref 6–20)
BUN/CREAT SERPL: 10 (ref 12–20)
CALCIUM SERPL-MCNC: 8.5 MG/DL (ref 8.5–10.1)
CANNABINOIDS UR QL SCN: NEGATIVE
CHLORIDE SERPL-SCNC: 103 MMOL/L (ref 97–108)
CK SERPL-CCNC: 139 U/L (ref 39–308)
CO2 SERPL-SCNC: 25 MMOL/L (ref 21–32)
COCAINE UR QL SCN: NEGATIVE
COLOR CSF: COLORLESS
COLOR SPUN CSF: COLORLESS
COLOR UR: ABNORMAL
COMMENT, HOLDF: NORMAL
COMMENT, HOLDF: NORMAL
CREAT SERPL-MCNC: 1.09 MG/DL (ref 0.7–1.3)
CRYPTOC AG CSF QL IA: NEGATIVE
DIFFERENTIAL METHOD BLD: ABNORMAL
DRUG SCRN COMMENT,DRGCM: ABNORMAL
EOSINOPHIL # BLD: 0 K/UL (ref 0–0.4)
EOSINOPHIL NFR BLD: 0 % (ref 0–7)
EPITH CASTS URNS QL MICRO: ABNORMAL /LPF
ERYTHROCYTE [DISTWIDTH] IN BLOOD BY AUTOMATED COUNT: 11.3 % (ref 11.5–14.5)
ERYTHROCYTE [SEDIMENTATION RATE] IN BLOOD: 4 MM/HR (ref 0–15)
GLOBULIN SER CALC-MCNC: 3.7 G/DL (ref 2–4)
GLUCOSE BLD STRIP.AUTO-MCNC: 87 MG/DL (ref 65–100)
GLUCOSE CSF-MCNC: 55 MG/DL (ref 40–70)
GLUCOSE SERPL-MCNC: 80 MG/DL (ref 65–100)
GLUCOSE UR STRIP.AUTO-MCNC: NEGATIVE MG/DL
HCT VFR BLD AUTO: 48.4 % (ref 36.6–50.3)
HGB BLD-MCNC: 16.7 G/DL (ref 12.1–17)
HGB UR QL STRIP: ABNORMAL
HIV 1+2 AB+HIV1 P24 AG SERPL QL IA: NONREACTIVE
HIV12 RESULT COMMENT, HHIVC: NORMAL
HYALINE CASTS URNS QL MICRO: ABNORMAL /LPF (ref 0–5)
IMM GRANULOCYTES # BLD: 0 K/UL (ref 0–0.04)
IMM GRANULOCYTES NFR BLD AUTO: 0 % (ref 0–0.5)
INR PPP: 1 (ref 0.9–1.1)
KETONES UR QL STRIP.AUTO: NEGATIVE MG/DL
LACTATE SERPL-SCNC: 1.6 MMOL/L (ref 0.4–2)
LEUKOCYTE ESTERASE UR QL STRIP.AUTO: NEGATIVE
LYMPHOCYTES # BLD: 0.9 K/UL (ref 0.8–3.5)
LYMPHOCYTES NFR BLD: 21 % (ref 12–49)
MCH RBC QN AUTO: 32.1 PG (ref 26–34)
MCHC RBC AUTO-ENTMCNC: 34.5 G/DL (ref 30–36.5)
MCV RBC AUTO: 92.9 FL (ref 80–99)
METHADONE UR QL: NEGATIVE
MONOCYTES # BLD: 0.4 K/UL (ref 0–1)
MONOCYTES NFR BLD: 9 % (ref 5–13)
NEUTS SEG # BLD: 3.1 K/UL (ref 1.8–8)
NEUTS SEG NFR BLD: 70 % (ref 32–75)
NITRITE UR QL STRIP.AUTO: NEGATIVE
NRBC # BLD: 0 K/UL (ref 0–0.01)
NRBC BLD-RTO: 0 PER 100 WBC
OPIATES UR QL: POSITIVE
PCP UR QL: NEGATIVE
PH UR STRIP: 6.5 [PH] (ref 5–8)
PLATELET # BLD AUTO: 143 K/UL (ref 150–400)
PMV BLD AUTO: 8.9 FL (ref 8.9–12.9)
POTASSIUM SERPL-SCNC: 4.4 MMOL/L (ref 3.5–5.1)
PROT CSF-MCNC: 37 MG/DL (ref 15–45)
PROT SERPL-MCNC: 7.4 G/DL (ref 6.4–8.2)
PROT UR STRIP-MCNC: NEGATIVE MG/DL
PROTHROMBIN TIME: 10.4 SEC (ref 9–11.1)
RBC # BLD AUTO: 5.21 M/UL (ref 4.1–5.7)
RBC # CSF: 1 /CU MM
RBC #/AREA URNS HPF: ABNORMAL /HPF (ref 0–5)
SAMPLES BEING HELD,HOLD: NORMAL
SAMPLES BEING HELD,HOLD: NORMAL
SERVICE CMNT-IMP: NORMAL
SODIUM SERPL-SCNC: 137 MMOL/L (ref 136–145)
SP GR UR REFRACTOMETRY: 1.02 (ref 1–1.03)
THERAPEUTIC RANGE,PTTT: NORMAL SECS (ref 58–77)
TROPONIN I SERPL-MCNC: <0.05 NG/ML
TUBE # CSF: 1
TUBE # CSF: 1
TUBE # CSF: 3
UROBILINOGEN UR QL STRIP.AUTO: 0.2 EU/DL (ref 0.2–1)
WBC # BLD AUTO: 4.5 K/UL (ref 4.1–11.1)
WBC # CSF: 1 /CU MM (ref 0–5)
WBC URNS QL MICRO: ABNORMAL /HPF (ref 0–4)

## 2018-11-25 PROCEDURE — 87389 HIV-1 AG W/HIV-1&-2 AB AG IA: CPT

## 2018-11-25 PROCEDURE — 74011636320 HC RX REV CODE- 636/320: Performed by: RADIOLOGY

## 2018-11-25 PROCEDURE — 87070 CULTURE OTHR SPECIMN AEROBIC: CPT

## 2018-11-25 PROCEDURE — 82550 ASSAY OF CK (CPK): CPT

## 2018-11-25 PROCEDURE — 87498 ENTEROVIRUS PROBE&REVRS TRNS: CPT

## 2018-11-25 PROCEDURE — 74177 CT ABD & PELVIS W/CONTRAST: CPT

## 2018-11-25 PROCEDURE — 87327 CRYPTOCOCCUS NEOFORM AG IA: CPT

## 2018-11-25 PROCEDURE — 009U3ZX DRAINAGE OF SPINAL CANAL, PERCUTANEOUS APPROACH, DIAGNOSTIC: ICD-10-PCS | Performed by: EMERGENCY MEDICINE

## 2018-11-25 PROCEDURE — 80053 COMPREHEN METABOLIC PANEL: CPT

## 2018-11-25 PROCEDURE — 85652 RBC SED RATE AUTOMATED: CPT

## 2018-11-25 PROCEDURE — 87385 HISTOPLASMA CAPSUL AG IA: CPT

## 2018-11-25 PROCEDURE — 82962 GLUCOSE BLOOD TEST: CPT

## 2018-11-25 PROCEDURE — 86038 ANTINUCLEAR ANTIBODIES: CPT

## 2018-11-25 PROCEDURE — 85730 THROMBOPLASTIN TIME PARTIAL: CPT

## 2018-11-25 PROCEDURE — 82945 GLUCOSE OTHER FLUID: CPT

## 2018-11-25 PROCEDURE — 71045 X-RAY EXAM CHEST 1 VIEW: CPT

## 2018-11-25 PROCEDURE — 65660000000 HC RM CCU STEPDOWN

## 2018-11-25 PROCEDURE — 77030014143 HC TY PUNC LUMBR BD -A

## 2018-11-25 PROCEDURE — 81001 URINALYSIS AUTO W/SCOPE: CPT

## 2018-11-25 PROCEDURE — 86592 SYPHILIS TEST NON-TREP QUAL: CPT

## 2018-11-25 PROCEDURE — 84484 ASSAY OF TROPONIN QUANT: CPT

## 2018-11-25 PROCEDURE — 74011250637 HC RX REV CODE- 250/637: Performed by: EMERGENCY MEDICINE

## 2018-11-25 PROCEDURE — 80307 DRUG TEST PRSMV CHEM ANLYZR: CPT

## 2018-11-25 PROCEDURE — 87015 SPECIMEN INFECT AGNT CONCNTJ: CPT

## 2018-11-25 PROCEDURE — 89050 BODY FLUID CELL COUNT: CPT

## 2018-11-25 PROCEDURE — 87086 URINE CULTURE/COLONY COUNT: CPT

## 2018-11-25 PROCEDURE — 85610 PROTHROMBIN TIME: CPT

## 2018-11-25 PROCEDURE — 87040 BLOOD CULTURE FOR BACTERIA: CPT

## 2018-11-25 PROCEDURE — 74011250636 HC RX REV CODE- 250/636: Performed by: EMERGENCY MEDICINE

## 2018-11-25 PROCEDURE — 74011000258 HC RX REV CODE- 258: Performed by: RADIOLOGY

## 2018-11-25 PROCEDURE — 83605 ASSAY OF LACTIC ACID: CPT

## 2018-11-25 PROCEDURE — 99284 EMERGENCY DEPT VISIT MOD MDM: CPT

## 2018-11-25 PROCEDURE — 86695 HERPES SIMPLEX TYPE 1 TEST: CPT

## 2018-11-25 PROCEDURE — 74011250636 HC RX REV CODE- 250/636: Performed by: HOSPITALIST

## 2018-11-25 PROCEDURE — 85025 COMPLETE CBC W/AUTO DIFF WBC: CPT

## 2018-11-25 PROCEDURE — 70450 CT HEAD/BRAIN W/O DYE: CPT

## 2018-11-25 PROCEDURE — 75810000133 HC LUMBAR PUNCTURE

## 2018-11-25 PROCEDURE — 93005 ELECTROCARDIOGRAM TRACING: CPT

## 2018-11-25 PROCEDURE — 36415 COLL VENOUS BLD VENIPUNCTURE: CPT

## 2018-11-25 PROCEDURE — 84157 ASSAY OF PROTEIN OTHER: CPT

## 2018-11-25 PROCEDURE — 96360 HYDRATION IV INFUSION INIT: CPT

## 2018-11-25 RX ORDER — LIDOCAINE HYDROCHLORIDE 20 MG/ML
10 INJECTION, SOLUTION INFILTRATION; PERINEURAL
Status: COMPLETED | OUTPATIENT
Start: 2018-11-25 | End: 2018-11-25

## 2018-11-25 RX ORDER — SODIUM CHLORIDE 9 MG/ML
100 INJECTION, SOLUTION INTRAVENOUS CONTINUOUS
Status: DISCONTINUED | OUTPATIENT
Start: 2018-11-25 | End: 2018-11-29 | Stop reason: HOSPADM

## 2018-11-25 RX ORDER — SODIUM CHLORIDE 0.9 % (FLUSH) 0.9 %
10 SYRINGE (ML) INJECTION
Status: COMPLETED | OUTPATIENT
Start: 2018-11-25 | End: 2018-11-25

## 2018-11-25 RX ORDER — ACETAMINOPHEN 500 MG
1000 TABLET ORAL
Status: COMPLETED | OUTPATIENT
Start: 2018-11-25 | End: 2018-11-25

## 2018-11-25 RX ORDER — ENOXAPARIN SODIUM 100 MG/ML
40 INJECTION SUBCUTANEOUS EVERY 24 HOURS
Status: DISCONTINUED | OUTPATIENT
Start: 2018-11-26 | End: 2018-11-29 | Stop reason: HOSPADM

## 2018-11-25 RX ORDER — SODIUM CHLORIDE 0.9 % (FLUSH) 0.9 %
5-10 SYRINGE (ML) INJECTION EVERY 8 HOURS
Status: DISCONTINUED | OUTPATIENT
Start: 2018-11-25 | End: 2018-11-29 | Stop reason: HOSPADM

## 2018-11-25 RX ORDER — ACETAMINOPHEN 325 MG/1
650 TABLET ORAL
Status: DISCONTINUED | OUTPATIENT
Start: 2018-11-25 | End: 2018-11-28

## 2018-11-25 RX ORDER — SODIUM CHLORIDE 0.9 % (FLUSH) 0.9 %
5-10 SYRINGE (ML) INJECTION AS NEEDED
Status: DISCONTINUED | OUTPATIENT
Start: 2018-11-25 | End: 2018-11-29 | Stop reason: HOSPADM

## 2018-11-25 RX ADMIN — Medication 10 ML: at 18:13

## 2018-11-25 RX ADMIN — LIDOCAINE HYDROCHLORIDE 200 MG: 20 INJECTION, SOLUTION INFILTRATION; PERINEURAL at 19:00

## 2018-11-25 RX ADMIN — IOPAMIDOL 100 ML: 755 INJECTION, SOLUTION INTRAVENOUS at 18:13

## 2018-11-25 RX ADMIN — SODIUM CHLORIDE 100 ML/HR: 900 INJECTION, SOLUTION INTRAVENOUS at 21:43

## 2018-11-25 RX ADMIN — ACETAMINOPHEN 1000 MG: 500 TABLET ORAL at 19:16

## 2018-11-25 RX ADMIN — SODIUM CHLORIDE 1000 ML: 900 INJECTION, SOLUTION INTRAVENOUS at 16:48

## 2018-11-25 RX ADMIN — SODIUM CHLORIDE 100 ML: 900 INJECTION, SOLUTION INTRAVENOUS at 18:13

## 2018-11-25 NOTE — ED PROVIDER NOTES
HPI  
 
  Previously healthy 45y M here with intermittent fever x 3 months. At the onset he would have fever a handful of times in a month with associated body aches and vomiting. Now these episodes are increasing in frequency to every few days. Has had multiple evaluations at different ED's without any clear etiology. Today had a temp to 103. Complains of feeling bad \"all over. \" When his fever is up, he does have HA and pressure in the back of his head, but then this improves as fever breaks. No rashes other than his skin gets red when his temp goes up. No recent travel. He denies IV drug use. Prior to 3 months ago he was healthy and didn't have problems like this. No neck stiffness. No cough. No chest pain. Occ has R sided abd pain but none currently. No focal weakness or numbness. Says that he has altered mental status when the fever goes up but this resolves when fever comes down. No known tick exposure. He believes there are raccoons in his attic but has not had any direct exposure to them. History reviewed. No pertinent past medical history. Past Surgical History:  
Procedure Laterality Date  HX RENAL TRANSPLANT  HX UROLOGICAL    
 left kidney removed  - donated  HX VASECTOMY History reviewed. No pertinent family history. Social History Socioeconomic History  Marital status: LEGALLY  Spouse name: Not on file  Number of children: Not on file  Years of education: Not on file  Highest education level: Not on file Social Needs  Financial resource strain: Not on file  Food insecurity - worry: Not on file  Food insecurity - inability: Not on file  Transportation needs - medical: Not on file  Transportation needs - non-medical: Not on file Occupational History  Not on file Tobacco Use  Smoking status: Never Smoker  Smokeless tobacco: Never Used Substance and Sexual Activity  Alcohol use: Yes   Alcohol/week: 12.0 oz  
 Types: 20 Cans of beer per week  Drug use: No  
 Sexual activity: Not on file Other Topics Concern  Not on file Social History Narrative  Not on file ALLERGIES: Patient has no known allergies. Review of Systems Review of Systems Constitutional: (-) weight loss. HEENT: (-) stiff neck Eyes: (-) discharge. Respiratory: (-) cough. Cardiovascular: (-) syncope. Gastrointestinal: (-) blood in stool. Genitourinary: (-) hematuria. Musculoskeletal: (-) myalgias. Neurological: (-) seizure. Skin: (-) petechiae Lymph/Immunologic: (-) enlarged lymph nodes All other systems reviewed and are negative. There were no vitals filed for this visit. Physical Exam Nursing note and vitals reviewed. Constitutional: oriented to person, place, and time. appears well-developed and well-nourished. No distress. Head: Normocephalic and atraumatic. Sclera anicteric Nose: No rhinorrhea Mouth/Throat: Oropharynx is clear and moist. Pharynx normal 
Eyes: Conjunctivae are normal. Pupils are equal, round, and reactive to light. Right eye exhibits no discharge. Left eye exhibits no discharge. Neck: Painless normal range of motion. Neck supple. No LAD. No meningismus. Cardiovascular: Normal rate, regular rhythm, normal heart sounds and intact distal pulses. Exam reveals no gallop and no friction rub. No murmur heard. Pulmonary/Chest:  No respiratory distress. No wheezes. No rales. No rhonchi. No increased work of breathing. No accessory muscle use. Good air exchange throughout. Abdominal: soft, non-tender, no rebound or guarding. No hepatosplenomegaly. Normal bowel sounds throughout. Back: no tenderness to palpation, no deformities, no CVA tenderness Extremities/Musculoskeletal: Normal range of motion. no tenderness. No edema. Distal extremities are neurovasc intact. Lymphadenopathy:   No adenopathy. Neurological:  Alert and oriented to person, place, and time.  Coordination normal. CN 2-12 intact. Motor and sensory function intact. Skin: Skin is warm and dry. No rash noted. No pallor. MDM  45y M here with intermittent fever. Unclear etiology. Will need labs, CXR, CT head, ECG, fluids. Likely will need admission for further evaluation. Lumbar Puncture Date/Time: 11/25/2018 8:18 PM 
Performed by: Lucinda Blackmon MD 
Authorized by: Lucinda Blackmon MD  
 
Consent:  
  Consent obtained:  Verbal and written Consent given by:  Patient Risks discussed:  Bleeding, infection, nerve damage, pain and repeat procedure Alternatives discussed:  No treatment Pre-procedure details:  
  Procedure purpose:  Diagnostic Anesthesia (see MAR for exact dosages): Anesthesia method:  Local infiltration Local anesthetic:  Lidocaine 1% w/o epi Procedure details:  
  Lumbar space:  L3-L4 interspace Patient position:  L lateral decubitus Needle gauge:  20 Needle type:  Spinal needle - Quincke tip Needle length (in):  2.5 Ultrasound guidance: no   
  Number of attempts:  1 Closing pressure (cm H2O):  18 Fluid appearance:  Clear Total volume (ml):  6 Post-procedure:  
  Puncture site:  Adhesive bandage applied and direct pressure applied Patient tolerance of procedure: Tolerated well, no immediate complications

## 2018-11-25 NOTE — ED TRIAGE NOTES
Patient arrives via EMS from home with c/o HA/neck pain, n/v and fever/chills intermittently x3 months with multiple ER visits to multiple facilities. Patient was seen at [facility] most recently. Most recent c/o started yesterday w/fevers again. VS stable en route per EMS

## 2018-11-26 LAB
ANION GAP SERPL CALC-SCNC: 8 MMOL/L (ref 5–15)
ATRIAL RATE: 96 BPM
B PERT DNA SPEC QL NAA+PROBE: NOT DETECTED
BUN SERPL-MCNC: 11 MG/DL (ref 6–20)
BUN/CREAT SERPL: 11 (ref 12–20)
C PNEUM DNA SPEC QL NAA+PROBE: NOT DETECTED
CALCIUM SERPL-MCNC: 8.2 MG/DL (ref 8.5–10.1)
CALCULATED P AXIS, ECG09: 48 DEGREES
CALCULATED R AXIS, ECG10: -2 DEGREES
CALCULATED T AXIS, ECG11: -6 DEGREES
CHLORIDE SERPL-SCNC: 102 MMOL/L (ref 97–108)
CO2 SERPL-SCNC: 27 MMOL/L (ref 21–32)
COMMENT, HOLDF: NORMAL
CREAT SERPL-MCNC: 0.99 MG/DL (ref 0.7–1.3)
DIAGNOSIS, 93000: NORMAL
ENTEROVIRUS BY PCR, UENPT: NORMAL
ERYTHROCYTE [DISTWIDTH] IN BLOOD BY AUTOMATED COUNT: 11.4 % (ref 11.5–14.5)
FLUAV H1 2009 PAND RNA SPEC QL NAA+PROBE: NOT DETECTED
FLUAV H1 RNA SPEC QL NAA+PROBE: NOT DETECTED
FLUAV H3 RNA SPEC QL NAA+PROBE: NOT DETECTED
FLUAV SUBTYP SPEC NAA+PROBE: NOT DETECTED
FLUBV RNA SPEC QL NAA+PROBE: NOT DETECTED
GLUCOSE SERPL-MCNC: 96 MG/DL (ref 65–100)
HADV DNA SPEC QL NAA+PROBE: NOT DETECTED
HCOV 229E RNA SPEC QL NAA+PROBE: NOT DETECTED
HCOV HKU1 RNA SPEC QL NAA+PROBE: NOT DETECTED
HCOV NL63 RNA SPEC QL NAA+PROBE: NOT DETECTED
HCOV OC43 RNA SPEC QL NAA+PROBE: NOT DETECTED
HCT VFR BLD AUTO: 42.2 % (ref 36.6–50.3)
HCV AB SERPL QL IA: NONREACTIVE
HCV COMMENT,HCGAC: NORMAL
HERPES SIMPLEX VIRUS, CSF, UHSPT: NORMAL
HGB BLD-MCNC: 15 G/DL (ref 12.1–17)
HMPV RNA SPEC QL NAA+PROBE: NOT DETECTED
HPIV1 RNA SPEC QL NAA+PROBE: NOT DETECTED
HPIV2 RNA SPEC QL NAA+PROBE: NOT DETECTED
HPIV3 RNA SPEC QL NAA+PROBE: NOT DETECTED
HPIV4 RNA SPEC QL NAA+PROBE: NOT DETECTED
M PNEUMO DNA SPEC QL NAA+PROBE: NOT DETECTED
MCH RBC QN AUTO: 33 PG (ref 26–34)
MCHC RBC AUTO-ENTMCNC: 35.5 G/DL (ref 30–36.5)
MCV RBC AUTO: 93 FL (ref 80–99)
NRBC # BLD: 0 K/UL (ref 0–0.01)
NRBC BLD-RTO: 0 PER 100 WBC
P-R INTERVAL, ECG05: 146 MS
PLATELET # BLD AUTO: 110 K/UL (ref 150–400)
PMV BLD AUTO: 9.1 FL (ref 8.9–12.9)
POTASSIUM SERPL-SCNC: 4 MMOL/L (ref 3.5–5.1)
Q-T INTERVAL, ECG07: 334 MS
QRS DURATION, ECG06: 84 MS
QTC CALCULATION (BEZET), ECG08: 421 MS
RBC # BLD AUTO: 4.54 M/UL (ref 4.1–5.7)
RPR SER QL: NONREACTIVE
RSV RNA SPEC QL NAA+PROBE: NOT DETECTED
RV+EV RNA SPEC QL NAA+PROBE: NOT DETECTED
SAMPLES BEING HELD,HOLD: NORMAL
SODIUM SERPL-SCNC: 137 MMOL/L (ref 136–145)
VENTRICULAR RATE, ECG03: 96 BPM
WBC # BLD AUTO: 3.1 K/UL (ref 4.1–11.1)

## 2018-11-26 PROCEDURE — 93970 EXTREMITY STUDY: CPT

## 2018-11-26 PROCEDURE — 86592 SYPHILIS TEST NON-TREP QUAL: CPT

## 2018-11-26 PROCEDURE — 36415 COLL VENOUS BLD VENIPUNCTURE: CPT

## 2018-11-26 PROCEDURE — 74011250636 HC RX REV CODE- 250/636: Performed by: INTERNAL MEDICINE

## 2018-11-26 PROCEDURE — 74011000258 HC RX REV CODE- 258: Performed by: INTERNAL MEDICINE

## 2018-11-26 PROCEDURE — 74011250637 HC RX REV CODE- 250/637: Performed by: INTERNAL MEDICINE

## 2018-11-26 PROCEDURE — 85027 COMPLETE CBC AUTOMATED: CPT

## 2018-11-26 PROCEDURE — 80048 BASIC METABOLIC PNL TOTAL CA: CPT

## 2018-11-26 PROCEDURE — 74011250637 HC RX REV CODE- 250/637: Performed by: HOSPITALIST

## 2018-11-26 PROCEDURE — 87798 DETECT AGENT NOS DNA AMP: CPT

## 2018-11-26 PROCEDURE — 74011250636 HC RX REV CODE- 250/636: Performed by: HOSPITALIST

## 2018-11-26 PROCEDURE — 86803 HEPATITIS C AB TEST: CPT

## 2018-11-26 PROCEDURE — 65620000000 HC RM CCU GENERAL

## 2018-11-26 RX ORDER — LORAZEPAM 1 MG/1
1 TABLET ORAL EVERY 4 HOURS
Status: COMPLETED | OUTPATIENT
Start: 2018-11-26 | End: 2018-11-27

## 2018-11-26 RX ORDER — KETOROLAC TROMETHAMINE 30 MG/ML
30 INJECTION, SOLUTION INTRAMUSCULAR; INTRAVENOUS
Status: DISCONTINUED | OUTPATIENT
Start: 2018-11-26 | End: 2018-11-28

## 2018-11-26 RX ORDER — LORAZEPAM 1 MG/1
1 TABLET ORAL EVERY 8 HOURS
Status: DISCONTINUED | OUTPATIENT
Start: 2018-11-29 | End: 2018-11-28

## 2018-11-26 RX ORDER — DIAZEPAM 10 MG/2ML
5 INJECTION INTRAMUSCULAR
Status: DISCONTINUED | OUTPATIENT
Start: 2018-11-26 | End: 2018-11-28

## 2018-11-26 RX ORDER — LORAZEPAM 1 MG/1
1 TABLET ORAL EVERY 6 HOURS
Status: DISCONTINUED | OUTPATIENT
Start: 2018-11-27 | End: 2018-11-28

## 2018-11-26 RX ADMIN — ENOXAPARIN SODIUM 40 MG: 40 INJECTION SUBCUTANEOUS at 23:15

## 2018-11-26 RX ADMIN — CEFEPIME HYDROCHLORIDE 2 G: 2 INJECTION, POWDER, FOR SOLUTION INTRAVENOUS at 22:56

## 2018-11-26 RX ADMIN — Medication 10 ML: at 15:32

## 2018-11-26 RX ADMIN — Medication 10 ML: at 21:39

## 2018-11-26 RX ADMIN — Medication 10 ML: at 19:01

## 2018-11-26 RX ADMIN — LORAZEPAM 1 MG: 0.5 TABLET ORAL at 21:39

## 2018-11-26 RX ADMIN — LORAZEPAM 1 MG: 0.5 TABLET ORAL at 23:15

## 2018-11-26 RX ADMIN — ACETAMINOPHEN 650 MG: 325 TABLET ORAL at 07:43

## 2018-11-26 RX ADMIN — SODIUM CHLORIDE 100 ML/HR: 900 INJECTION, SOLUTION INTRAVENOUS at 15:27

## 2018-11-26 RX ADMIN — KETOROLAC TROMETHAMINE 30 MG: 30 INJECTION, SOLUTION INTRAMUSCULAR at 19:01

## 2018-11-26 NOTE — H&P
[address]  HISTORY AND PHYSICAL Larry Zazueta 
MR#: [id number] : 1980 ACCOUNT #: [de-identified] ADMIT DATE: 2018 PRIMARY CARE PHYSICIAN:  None. PRESENTING COMPLAINT:  Fever, abdominal pain, rash, tingling, numbness, fatigue and joint pains. 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 Patient is not a good historian. His girlfriend is sitting with him. According to the patient, he is having lower abdominal pain with off and on fevers for a while. He was diagnosed with epididymo orchitis in the emergency room and was given a 10-day course of Levaquin, which according to him, did not help. On further questioning, the patient tells me that he had this lower abdominal pain, which is getting more frequent. When he gets this pain, there is a rash associated with it, petechial in nature on the right side of abdomen. The patient also complains of fatigue, tingling and numbness of both his arms and both his hands and feet. He denies having any nausea, vomiting, problem with bowel movement of urination. Today, he was brought into the emergency room as he had a fever of 102. The patient's girlfriend tells me that he lives in an old house and in the attic there are raccoons and bats and their feces is coming down from the attic. When I talked to the patient, he said that he took candelario's urine to clean the attic. Patient complains of joint pains, but when I asked him specifically, he is not able to tell me which joints really hurt him. He denies having any chills but his girl friend thinks he shakes not clear to her if this is seizure or not. He has some neck pain but denies having any headache. He has been seen in different emergency rooms with no clearcut diagnosis.   In the emergency room, his lab work revealed a normal CBC and we are asked to admit him for further evaluation. PAST SURGICAL HISTORY:   
 
Significant for one kidney. SOCIOECONOMIC HISTORY:   
 
The patient does not smoke. He does drink 20 beers a week. He does not use any drugs. He is a . He lives with his girlfriend. FAMILY HISTORY:  
 Unremarkable. ALLERGIES:  
 
 NO KNOWN DRUG ALLERGIES. CURRENT MEDICATIONS:  
 
 P.r.n. oxycodone and MiraLax. REVIEW OF SYSTEMS: 
CARDIOVASCULAR:  Negative for palpitation. Negative for chest pain. RESPIRATORY:  Negative for shortness of breath, negative for cough. GASTROINTESTINAL:  Negative for nausea, vomiting. Positive for abdominal pain. Negative for weight loss. CENTRAL NERVOUS SYSTEM:  Positive for tingling, numbness and neck pain. PHYSICAL EXAMINATION: 
GENERAL:  The patient is a 59-year-old gentleman who does not look acutely ill. VITAL SIGNS:  Temperature 98.9, blood pressure 135/82, pulse is 96, respiratory rate is 20, saturation is 100% on room air. HEENT:  Pupils equally reacting to light and accommodation. NECK:  Supple. There is no lymphadenopathy or JVD. CHEST:  Clear. No wheezing, no crackles. HEART:  S1 and S2 regular. No murmur, no S3. 
ABDOMEN:  No sign of chronic liver disease. EXTREMITIES:  No pedal edema. Good peripheral pulses. No cyanosis or clubbing. SKIN:  Unremarkable. MUSCULOSKELETAL:  Does not reveal any decreased range of motion to any joint. CENTRAL NERVOUS SYSTEM:  The patient is alert, oriented, has no significant sensory loss. He has normal reflexes and plantars are downgoing. LABORATORY DATA:  Lab work in ER revealed a white count of 4.5, hemoglobin of 16.7, hematocrit 48.4, MCV 92.9, platelet count is 597,589.   His chemistries reveal sodium 137, potassium 4.4, chloride is 103, bicarb is 25, gap of 9, glucose is 80, BUN is 11, creatinine is 1.09, bilirubin 0.9, protein 7.4, albumin 3.7, globulin is 3.7, ALT 60, AST is 40, alkaline phosphatase is 47. Lactic acid is 1.6. CPK is 139. His EKG shows normal sinus rhythm with a ventricular rate of 96 beats per minute. CT head is unremarkable. Chest x-ray is unremarkable. CT abdomen and pelvis unremarkable ASSESSMENT AND PLAN:  
 
 The patient is a 45-year-old gentleman with no previous medical history except for one kidney, is admitted for further workup of: 1. Recurrent fever associated with abdominal pain, joint pains, tingling, numbness. Exact etiology is not clear. 2.  Patient denies using any drugs. We will obtain  blood culture. 3.  Consult Infectious Disease. 4.  Patient says that he lives in a house where there are raccoons and bats and their feces is coming from the attic to his room. We will need to evaluate for zoonotic infections. Urine histoplasma screen ordered. 5.  I have ordered GIBSON and a sed rate. 6.  Patient will need  lumbar puncture to rule out viral infection. HSV, RPR, Enterovirus are ordered. LP  Done it shows no WBC. Other studies are pending. 7.  History of questionable epididymal orchitis; however, the patient says Levaquin did not help him in the past.   
8.  Patient urine tox screen will be checked,  he denies using any drugs. 9.  If all workup is negative, then he will need echocardiogram to rule out endocarditis. I do not appreciate any murmur at this time. 10. HIV serology due ordered to Mild thrombocytopenia . MD HENRI Gandhi/ 
D: 11/25/2018 18:22 T: 11/25/2018 22:01 JOB #: [id number]

## 2018-11-26 NOTE — PROCEDURES
[facility]  *** FINAL REPORT ***    Name: Richard Dempsey  MRN: [id number]    Inpatient  : 1980  HIS Order #: [id number]  [id number] [facility] Visit #: [id number]  Date: 2018    TYPE OF TEST: Peripheral Venous Testing    REASON FOR TEST  Pain in limb, Limb swelling    Right Arm:-  Deep venous thrombosis:           No  Superficial venous thrombosis:    Yes    Left Arm:-  Deep venous thrombosis:           No  Superficial venous thrombosis:    No      INTERPRETATION/FINDINGS  PROCEDURE:  Color duplex ultrasound imaging of upper extremity veins. FINDINGS:       Right:  Age indeterminate, partially occlusive SVT in the  cephalic vein at the [facility] [place] (near the IV insertion). Venous flow  through this stenosis has reduced phasicity and appears more  continuous. The internal jugular, subclavian, axillary, brachial, and   basilic veins are patent and without evidence of thrombus;  each is  fully compressible and there is no narrowing of the flow channel on  color Doppler imaging. Phasic/pulsatile flow is observed in the  subclavian vein. Left:    The internal jugular, subclavian, axillary, brachial,  and basilic veins are patent and without evidence of thrombus;  each  is fully compressible and there is no narrowing of the flow channel on   color Doppler imaging. Phasic/pulsatile flow is observed in the  subclavian vein. The cephalic vein was unable to be visualized. IMPRESSION:  Age indeterminate, partially occlusive  superficial  venous thrombosis of the right cephalic at the [facility] [place] (near the IV  insertion site). The remaining veins imaged in the bilateral upper  extremities appear patent and free of thrombosis. ADDITIONAL COMMENTS    I have personally reviewed the data relevant to the interpretation of  this  study.     TECHNOLOGIST: Moisés Fraser  Signed: 2018 11:47 AM    PHYSICIAN: Amparo Martinez MD  Signed: 2018 08:14 AM

## 2018-11-26 NOTE — PROGRESS NOTES
11/26/18 1819 Vital Signs Temp 98.8 °F (37.1 °C) Temp Source Oral  
Pulse (Heart Rate) (!) 112 Heart Rate Source Monitor Resp Rate 16 Level of Consciousness Alert  
BP (!) 147/91 MAP (Calculated) 110 BP 1 Method Automatic  
BP 1 Location Right arm BP Patient Position At rest  
MEWS Score 3  
 
 
 11/26/18 1819 Vital Signs Temp 98.8 °F (37.1 °C) Temp Source Oral  
Pulse (Heart Rate) (!) 112 Heart Rate Source Monitor Resp Rate 16 Level of Consciousness Alert  
BP (!) 147/91 MAP (Calculated) 110 BP 1 Method Automatic  
BP 1 Location Right arm BP Patient Position At rest  
MEWS Score 3 Dr Jairo Small called and informed about patient's MEWS score and that fact that the patient is making a jerking motion in the bed every minute. Patient is alert and oriented and says that he has been having problems with this for the last month.

## 2018-11-26 NOTE — PROGRESS NOTES
Hospitalist Progress Note Yasmani Silverman MD 
Answering service: [phone] OR [phone] from in house phone Date of Service:  2018 NAME:  Navya Brothers :  1980 MRN:  [id number] Admission Summary:  
 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 Patient is not a good historian. His girlfriend is sitting with him. According to the patient, he is having lower abdominal pain with off and on fevers for a while. He was diagnosed with epididymo orchitis in the emergency room and was given a 10-day course of Levaquin, which according to him, did not help. Interval history / Subjective:  
Complaining of some back pain, RLE swelling and erythema. Has a mild cough. Says his joints seem to hurt him all over. Assessment & Plan:  
 
Fever: unknown etiology, infectious vs. Drug induced vs. Rheumotologic. Says has been ongoing for 3 days, but has been having \"pains\" for a few months. - LP  benign, some labs still pending 
- HIV negative - Send PCR viral swab, GC/C urine, RPR 
- F/U Blood cultures, consider echocardiogram if all workup is negative. - GIBSON and ESR sent 
- Histo sent UDS + Amphetamine and opioids. ? If pain is related, ? Fever is related. - Patient continues to deny using any illicit drugs, but stated he has been \"on and off\" adderol.  
-  reviewed, patient is not prescribed any opioids regularly, and no amphetamine such as aderrol has ever been filled. Last received 8 pills of tramadol in the beginning of the month. Girlfriend said they were given vicodin in [facility], but none was filled according to . RLE swelling - ? If related to rheumotologic diagnosis - Sent Dopplers, and F/U GIBSON Code status: FULL 
DVT prophylaxis: Lovenox Care Plan discussed with: Patient/Family Disposition: Home w/Family and TBD Hospital Problems  Date Reviewed: 11/25/2018 Codes Class Noted POA * (Principal) Abdominal pain ICD-10-CM: R10.9 ICD-9-CM: 789.00  11/25/2018 Unknown Review of Systems: A comprehensive review of systems was negative except for that written in the HPI. Vital Signs:  
 Last 24hrs VS reviewed since prior progress note. Most recent are: 
Visit Vitals /90 (BP 1 Location: Left arm, BP Patient Position: At rest) Pulse 97 Temp 98.7 °F (37.1 °C) Resp 19 Ht 5' 7\" (1.702 m) Wt 90.7 kg (200 lb) SpO2 98% BMI 31.32 kg/m² Intake/Output Summary (Last 24 hours) at 11/26/2018 1031 Last data filed at 11/26/2018 2425 Gross per 24 hour Intake 1066.67 ml Output  Net 1066.67 ml Physical Examination:  
 
 
     
Constitutional:  No acute distress, cooperative, pleasant, laying in bed ENT:  Oral mucous moist, oropharynx benign. Neck supple, Resp:  CTA bilaterally. No wheezing/rhonchi/rales. No accessory muscle use CV:  Regular rhythm, normal rate, no murmurs, gallops, rubs GI:  Soft, non distended, non tender. normoactive bowel sounds, no hepatosplenomegaly Musculoskeletal:  No edema, warm, 2+ pulses throughout Neurologic:  Moves all extremities. AAOx3, CN II-XII reviewed Skin:  Erythmea and dactilitis on the RUE, multiple tattoos Data Review:  
Imaging and laboratory data reviewed. Labs:  
 
Recent Labs  
  11/25/18 
1641 WBC 4.5 HGB 16.7 HCT 48.4 * Recent Labs  
  11/25/18 
1641   
K 4.4  
 CO2 25 BUN 11  
CREA 1.09  
GLU 80  
CA 8.5 Recent Labs  
  11/25/18 
1641 SGOT 40* ALT 60 AP 47 TBILI 0.9 TP 7.4 ALB 3.7 GLOB 3.7 Recent Labs  
  11/25/18 
1641 INR 1.0 PTP 10.4 APTT 29.9 No results for input(s): FE, TIBC, PSAT, FERR in the last 72 hours.   
No results found for: FOL, RBCF  
 No results for input(s): PH, PCO2, PO2 in the last 72 hours. Recent Labs  
  11/25/18 
1641  TROIQ <0.05 No results found for: CHOL, CHOLX, CHLST, CHOLV, HDL, LDL, LDLC, DLDLP, TGLX, TRIGL, TRIGP, CHHD, CHHDX Lab Results Component Value Date/Time Glucose (POC) 87 11/25/2018 04:38 PM  
 Glucose (POC) 104 06/21/2010 10:53 PM  
 
Lab Results Component Value Date/Time Color YELLOW/STRAW 11/25/2018 06:52 PM  
 Appearance CLEAR 11/25/2018 06:52 PM  
 Specific gravity 1.024 11/25/2018 06:52 PM  
 Specific gravity 1.025 09/01/2018 01:45 AM  
 pH (UA) 6.5 11/25/2018 06:52 PM  
 Protein NEGATIVE  11/25/2018 06:52 PM  
 Glucose NEGATIVE  11/25/2018 06:52 PM  
 Ketone NEGATIVE  11/25/2018 06:52 PM  
 Bilirubin NEGATIVE  11/25/2018 06:52 PM  
 Urobilinogen 0.2 11/25/2018 06:52 PM  
 Nitrites NEGATIVE  11/25/2018 06:52 PM  
 Leukocyte Esterase NEGATIVE  11/25/2018 06:52 PM  
 Epithelial cells FEW 11/25/2018 06:52 PM  
 Bacteria NEGATIVE  11/25/2018 06:52 PM  
 WBC 0-4 11/25/2018 06:52 PM  
 RBC  11/25/2018 06:52 PM  
 
 
 
Medications Reviewed:  
 
Current Facility-Administered Medications Medication Dose Route Frequency  sodium chloride (NS) flush 5-10 mL  5-10 mL IntraVENous Q8H  
 sodium chloride (NS) flush 5-10 mL  5-10 mL IntraVENous PRN  
 enoxaparin (LOVENOX) injection 40 mg  40 mg SubCUTAneous Q24H  
 acetaminophen (TYLENOL) tablet 650 mg  650 mg Oral Q6H PRN  
 0.9% sodium chloride infusion  100 mL/hr IntraVENous CONTINUOUS No current outpatient medications on file.  
 
______________________________________________________________________ EXPECTED LENGTH OF STAY: - - - 
ACTUAL LENGTH OF STAY:          1 
 
            
Joelyn Melissa, MD

## 2018-11-26 NOTE — PROCEDURES
[facility]  *** FINAL REPORT ***    Name: Miki Galdamez  MRN: [id number]    Inpatient  : 1980  HIS Order #: [id number]  [address] [facility] Visit #: [id number]  Date: 2018    TYPE OF TEST: Peripheral Venous Testing    REASON FOR TEST  Pain in limb, Limb swelling    Right Leg:-  Deep venous thrombosis:           No  Superficial venous thrombosis:    No  Deep venous insufficiency:        Not examined  Superficial venous insufficiency: Not examined    Left Leg:-  Deep venous thrombosis:           No  Superficial venous thrombosis:    No  Deep venous insufficiency:        Not examined  Superficial venous insufficiency: Not examined      INTERPRETATION/FINDINGS  PROCEDURE:  Color duplex ultrasound imaging of lower extremity veins. FINDINGS:       Right: The common femoral, deep femoral, femoral, popliteal,  posterior tibial, peroneal, and great saphenous are patent and without   evidence of thrombus;  each is fully compressible and there is no  narrowing of the flow channel on color Doppler imaging. Phasic flow  is observed in the common femoral vein. Left:   The common femoral, deep femoral, femoral, popliteal,  posterior tibial, peroneal, and great saphenous are patent and without   evidence of thrombus;  each is fully compressible and there is no  narrowing of the flow channel on color Doppler imaging. Phasic flow  is observed in the common femoral vein. IMPRESSION:  No evidence of right or left lower extremity vein  thrombosis. ADDITIONAL COMMENTS    I have personally reviewed the data relevant to the interpretation of  this  study.     TECHNOLOGIST: Ej Rodriguez  Signed: 2018 11:57 AM    PHYSICIAN: Jose Alejandro Wolfe MD  Signed: 2018 08:14 AM

## 2018-11-27 LAB
BACTERIA SPEC CULT: NORMAL
BASOPHILS # BLD: 0 K/UL (ref 0–0.1)
BASOPHILS NFR BLD: 0 % (ref 0–1)
CC UR VC: NORMAL
DIFFERENTIAL METHOD BLD: ABNORMAL
EOSINOPHIL # BLD: 0 K/UL (ref 0–0.4)
EOSINOPHIL NFR BLD: 1 % (ref 0–7)
ERYTHROCYTE [DISTWIDTH] IN BLOOD BY AUTOMATED COUNT: 11.5 % (ref 11.5–14.5)
HCT VFR BLD AUTO: 42 % (ref 36.6–50.3)
HGB BLD-MCNC: 14.6 G/DL (ref 12.1–17)
IMM GRANULOCYTES # BLD: 0 K/UL (ref 0–0.04)
IMM GRANULOCYTES NFR BLD AUTO: 0 % (ref 0–0.5)
LYMPHOCYTES # BLD: 0.8 K/UL (ref 0.8–3.5)
LYMPHOCYTES NFR BLD: 17 % (ref 12–49)
MCH RBC QN AUTO: 32.2 PG (ref 26–34)
MCHC RBC AUTO-ENTMCNC: 34.8 G/DL (ref 30–36.5)
MCV RBC AUTO: 92.7 FL (ref 80–99)
MONOCYTES # BLD: 0.4 K/UL (ref 0–1)
MONOCYTES NFR BLD: 8 % (ref 5–13)
NEUTS SEG # BLD: 3.6 K/UL (ref 1.8–8)
NEUTS SEG NFR BLD: 74 % (ref 32–75)
NRBC # BLD: 0 K/UL (ref 0–0.01)
NRBC BLD-RTO: 0 PER 100 WBC
PLATELET # BLD AUTO: 125 K/UL (ref 150–400)
PMV BLD AUTO: 9.5 FL (ref 8.9–12.9)
RBC # BLD AUTO: 4.53 M/UL (ref 4.1–5.7)
RHEUMATOID FACT SERPL-ACNC: <10 IU/ML
SERVICE CMNT-IMP: NORMAL
WBC # BLD AUTO: 4.9 K/UL (ref 4.1–11.1)

## 2018-11-27 PROCEDURE — 74011000258 HC RX REV CODE- 258: Performed by: INTERNAL MEDICINE

## 2018-11-27 PROCEDURE — 74011250637 HC RX REV CODE- 250/637: Performed by: HOSPITALIST

## 2018-11-27 PROCEDURE — 36415 COLL VENOUS BLD VENIPUNCTURE: CPT

## 2018-11-27 PROCEDURE — 85025 COMPLETE CBC W/AUTO DIFF WBC: CPT

## 2018-11-27 PROCEDURE — 93306 TTE W/DOPPLER COMPLETE: CPT

## 2018-11-27 PROCEDURE — 74011250636 HC RX REV CODE- 250/636: Performed by: INTERNAL MEDICINE

## 2018-11-27 PROCEDURE — 74011250636 HC RX REV CODE- 250/636: Performed by: HOSPITALIST

## 2018-11-27 PROCEDURE — 86431 RHEUMATOID FACTOR QUANT: CPT

## 2018-11-27 PROCEDURE — 87591 N.GONORRHOEAE DNA AMP PROB: CPT

## 2018-11-27 PROCEDURE — 65660000001 HC RM ICU INTERMED STEPDOWN

## 2018-11-27 PROCEDURE — 74011250637 HC RX REV CODE- 250/637: Performed by: INTERNAL MEDICINE

## 2018-11-27 RX ADMIN — LORAZEPAM 1 MG: 0.5 TABLET ORAL at 15:10

## 2018-11-27 RX ADMIN — Medication 10 ML: at 23:14

## 2018-11-27 RX ADMIN — Medication 5 MG: at 21:02

## 2018-11-27 RX ADMIN — CEFEPIME HYDROCHLORIDE 2 G: 2 INJECTION, POWDER, FOR SOLUTION INTRAVENOUS at 23:13

## 2018-11-27 RX ADMIN — ACETAMINOPHEN 650 MG: 325 TABLET ORAL at 11:43

## 2018-11-27 RX ADMIN — ACETAMINOPHEN 650 MG: 325 TABLET ORAL at 04:09

## 2018-11-27 RX ADMIN — Medication 10 ML: at 15:12

## 2018-11-27 RX ADMIN — LORAZEPAM 1 MG: 0.5 TABLET ORAL at 08:21

## 2018-11-27 RX ADMIN — LORAZEPAM 1 MG: 1 TABLET ORAL at 21:02

## 2018-11-27 RX ADMIN — LORAZEPAM 1 MG: 0.5 TABLET ORAL at 11:43

## 2018-11-27 RX ADMIN — CEFEPIME HYDROCHLORIDE 2 G: 2 INJECTION, POWDER, FOR SOLUTION INTRAVENOUS at 06:42

## 2018-11-27 RX ADMIN — SODIUM CHLORIDE 100 ML/HR: 900 INJECTION, SOLUTION INTRAVENOUS at 16:12

## 2018-11-27 RX ADMIN — CEFEPIME HYDROCHLORIDE 2 G: 2 INJECTION, POWDER, FOR SOLUTION INTRAVENOUS at 15:09

## 2018-11-27 RX ADMIN — LORAZEPAM 1 MG: 0.5 TABLET ORAL at 03:39

## 2018-11-27 RX ADMIN — Medication 10 ML: at 06:43

## 2018-11-27 RX ADMIN — Medication 10 ML: at 06:42

## 2018-11-27 NOTE — PROGRESS NOTES
11/26/18 2015 Bedside and Verbal shift change report given to Mustapha Montes (oncoming nurse) by Adair Al (offgoing nurse). Report included the following information SBAR. Primary Nurse Paula Liu RN and Adair Al, RN performed a dual skin assessment on this patient No impairment noted Canelo score is 21 Tattoos Flushed skin 
diaphoretic 2140 TRANSFER - OUT REPORT: 
 
Verbal report given to Dorita(name) on Amaris Hope  being transferred to CCU(unit) for change in patient condition(Withdrawal monitoring) Report consisted of patients Situation, Background, Assessment and  
Recommendations(SBAR). Information from the following report(s) SBAR, ED Summary, [facility] and Recent Results was reviewed with the receiving nurse. Lines:  
Peripheral IV 11/25/18 Right Forearm (Active) Site Assessment Clean, dry, & intact 11/26/2018 10:00 PM  
Phlebitis Assessment 0 11/26/2018 10:00 PM  
Infiltration Assessment 0 11/26/2018 10:00 PM  
Dressing Status Clean, dry, & intact 11/26/2018 10:00 PM  
Dressing Type Transparent;Tape 11/26/2018 10:00 PM  
Hub Color/Line Status Pink;Flushed 11/26/2018 10:00 PM  
Action Taken Open ports on tubing capped 11/26/2018 10:00 PM  
Alcohol Cap Used Yes 11/26/2018 10:00 PM  
   
Peripheral IV 11/25/18 Right Antecubital (Active) Site Assessment Clean, dry, & intact 11/26/2018 10:00 PM  
Phlebitis Assessment 0 11/26/2018 10:00 PM  
Infiltration Assessment 0 11/26/2018 10:00 PM  
Dressing Status Clean, dry, & intact 11/26/2018 10:00 PM  
Dressing Type Transparent;Tape 11/26/2018 10:00 PM  
Hub Color/Line Status Pink;Flushed 11/26/2018 10:00 PM  
Action Taken Open ports on tubing capped 11/26/2018 10:00 PM  
Alcohol Cap Used Yes 11/26/2018 10:00 PM  
  
 
Opportunity for questions and clarification was provided. Patient transported with: 
 Registered Nurse Tech

## 2018-11-27 NOTE — PROGRESS NOTES
Problem: Falls - Risk of 
Goal: *Absence of Falls Document Rosa Snyder Fall Risk and appropriate interventions in the flowsheet. Outcome: Progressing Towards Goal 
Fall Risk Interventions: 
  
 
  
 
Medication Interventions: Evaluate medications/consider consulting pharmacy, Patient to call before getting OOB Problem: Pressure Injury - Risk of 
Goal: *Prevention of pressure injury Document Canelo Scale and appropriate interventions in the flowsheet. Outcome: Progressing Towards Goal 
Pressure Injury Interventions: Activity Interventions: Increase time out of bed, Pressure redistribution bed/mattress(bed type), PT/OT evaluation Nutrition Interventions: Document food/fluid/supplement intake Friction and Shear Interventions: Lift sheet, HOB 30 degrees or less

## 2018-11-27 NOTE — INTERDISCIPLINARY ROUNDS
IDR/SLIDR Summary Patient: Emanuel Ba MRN: [id number]    Age: 40 y.o. YOB: 1980 Room/Bed: [address] [place] [address] Admit Diagnosis: Abdominal pain  Principal Diagnosis: Abdominal pain Yesi Manjarrez Readmission: NO  Quality Measure: Not applicable VTE Prophylaxis: Chemical 
Influenza Vaccine screening completed? YES Pneumococcal Vaccine screening completed? Nutrition plan:No 
Consults:   
Financial concerns:Yes  Escalated to CM? YES 
RRAT Score: Interventions: 
Testing due for pt today? LOS: 2 days Expected length of stay  days Discharge plan:    PCP: None Transportation needs:   
Days before discharge: 
Discharge disposition:  
 
Signed:  
 
Elaina Enamorado RN 
11/27/2018 
6:39 AM

## 2018-11-27 NOTE — PROGRESS NOTES
Hospitalist Progress Note Hermilo Ibarra MD 
Answering service: [phone] or [phone] from in house phone Date of Service:  2018 NAME:  Whitney Langston :  1980 MRN:  [id number] Admission Summary:  
 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 Patient is not a good historian. His girlfriend is sitting with him. According to the patient, he is having lower abdominal pain with off and on fevers for a while. He was diagnosed with epididymo orchitis in the emergency room and was given a 10-day course of Levaquin, which according to him, did not help. Interval history / Subjective:  
Having spikes of fevers. , Says his joints seem to hurt him all over. Assessment & Plan:  
 
Fever: unknown etiology, infectious vs. Drug induced vs. Rheumotologic. Says has been ongoing for 3 days, but has been having \"pains\" for a few months. - LP  benign, HSV,Enterovirus neg, some labs still pending 
- HIV negative - Send PCR viral swab, GC/C urine, 
-Hep C ab neg,  RPR neg,Resp Panel unremarkable - F/U Blood cultures, 
- Echocardiogram ordered - GIBSON pending - ESR neg 
-Check RF 
- Histo sent  
-Request ID eval for Recommendations UDS + Amphetamine and opioids. ? If pain is related, ? Fever is related. - Patient continues to deny using any illicit drugs, but stated he has been \"on and off\" adderol.  
-  reviewed by previous Hospitalist, patient is not prescribed any opioids regularly, and no amphetamine such as aderrol has ever been filled. Last received 8 pills of tramadol in the beginning of the month. Girlfriend said they were given vicodin in [facility], but none was filled according to . RLE swelling - ? If related to rheumotologic diagnosis - Sent Dopplers - neg for DVT 
-  F/U GIBSON Code status: FULL 
 DVT prophylaxis: Lovenox Care Plan discussed with: Patient/Family Disposition: Home w/Family and TBD  
T/F to [facility] Problems  Date Reviewed: 11/25/2018 Codes Class Noted POA * (Principal) Abdominal pain ICD-10-CM: R10.9 ICD-9-CM: 789.00  11/25/2018 Unknown Review of Systems: A comprehensive review of systems was negative except for that written in the HPI. Vital Signs:  
 Last 24hrs VS reviewed since prior progress note. Most recent are: 
Visit Vitals /89 Pulse (!) 104 Temp (!) 101.7 °F (38.7 °C) Resp (!) 31 Ht 5' 7\" (1.702 m) Wt 90.6 kg (199 lb 11.8 oz) SpO2 96% BMI 31.28 kg/m² Intake/Output Summary (Last 24 hours) at 11/27/2018 5738 Last data filed at 11/27/2018 0448 Gross per 24 hour Intake 3041.67 ml Output 1100 ml Net 1941.67 ml Physical Examination:  
 
 
     
Constitutional:  No acute distress, cooperative, pleasant, laying in bed ENT:  Oral mucous moist, oropharynx benign. Neck supple, Resp:  CTA bilaterally. No wheezing/rhonchi/rales. No accessory muscle use CV:  Regular rhythm, normal rate, no murmurs, gallops, rubs GI:  Soft, non distended, non tender. normoactive bowel sounds, no hepatosplenomegaly Musculoskeletal:  No edema, warm, 2+ pulses throughout Neurologic:  Moves all extremities. AAOx3, CN II-XII reviewed Skin:  Swollen Finger Joints, multiple tattoos , no rashes noted Data Review:  
Imaging and laboratory data reviewed. Labs:  
 
Recent Labs  
  11/27/18 
1144 11/26/18 
1314 WBC 4.9 3.1* HGB 14.6 15.0  
HCT 42.0 42.2 * 110* Recent Labs  
  11/26/18 
1314 11/25/18 
1641  137  
K 4.0 4.4  103 CO2 27 25 BUN 11 11 CREA 0.99 1.09  
GLU 96 80  
CA 8.2* 8.5 Recent Labs  
  11/25/18 
1641 SGOT 40* ALT 60 AP 47 TBILI 0.9 TP 7.4 ALB 3.7 GLOB 3.7 Recent Labs  
  11/25/18 
1641 INR 1.0 PTP 10.4 APTT 29.9 No results for input(s): FE, TIBC, PSAT, FERR in the last 72 hours. No results found for: FOL, RBCF No results for input(s): PH, PCO2, PO2 in the last 72 hours. Recent Labs  
  11/25/18 
1641  TROIQ <0.05 No results found for: CHOL, CHOLX, CHLST, CHOLV, HDL, LDL, LDLC, DLDLP, TGLX, TRIGL, TRIGP, CHHD, CHHDX Lab Results Component Value Date/Time Glucose (POC) 87 11/25/2018 04:38 PM  
 Glucose (POC) 104 06/21/2010 10:53 PM  
 
Lab Results Component Value Date/Time Color YELLOW/STRAW 11/25/2018 06:52 PM  
 Appearance CLEAR 11/25/2018 06:52 PM  
 Specific gravity 1.024 11/25/2018 06:52 PM  
 Specific gravity 1.025 09/01/2018 01:45 AM  
 pH (UA) 6.5 11/25/2018 06:52 PM  
 Protein NEGATIVE  11/25/2018 06:52 PM  
 Glucose NEGATIVE  11/25/2018 06:52 PM  
 Ketone NEGATIVE  11/25/2018 06:52 PM  
 Bilirubin NEGATIVE  11/25/2018 06:52 PM  
 Urobilinogen 0.2 11/25/2018 06:52 PM  
 Nitrites NEGATIVE  11/25/2018 06:52 PM  
 Leukocyte Esterase NEGATIVE  11/25/2018 06:52 PM  
 Epithelial cells FEW 11/25/2018 06:52 PM  
 Bacteria NEGATIVE  11/25/2018 06:52 PM  
 WBC 0-4 11/25/2018 06:52 PM  
 RBC  11/25/2018 06:52 PM  
 
 
 
Medications Reviewed:  
 
Current Facility-Administered Medications Medication Dose Route Frequency  ketorolac (TORADOL) injection 30 mg  30 mg IntraVENous Q6H PRN  
 LORazepam (ATIVAN) tablet 1 mg  1 mg Oral Q4H  
 LORazepam (ATIVAN) tablet 1 mg  1 mg Oral Q6H  
 [START ON 11/29/2018] LORazepam (ATIVAN) tablet 1 mg  1 mg Oral Q8H  
 diazePAM (VALIUM) injection 5 mg  5 mg IntraVENous Q1H PRN  
 cefepime (MAXIPIME) 2 g in 0.9% sodium chloride (MBP/ADV) 100 mL  2 g IntraVENous Q8H  
 sodium chloride (NS) flush 5-10 mL  5-10 mL IntraVENous Q8H  
 sodium chloride (NS) flush 5-10 mL  5-10 mL IntraVENous PRN  
 enoxaparin (LOVENOX) injection 40 mg  40 mg SubCUTAneous Q24H  
 acetaminophen (TYLENOL) tablet 650 mg  650 mg Oral Q6H PRN  
  0.9% sodium chloride infusion  100 mL/hr IntraVENous CONTINUOUS  
 
______________________________________________________________________ EXPECTED LENGTH OF STAY: 2d 19h ACTUAL LENGTH OF STAY:          2 
 
            
Ivon Calvo MD

## 2018-11-27 NOTE — PROGRESS NOTES
0000  Bedside shift change report given to Negro Soni (oncoming nurse) by Gaston Gu (offgoing nurse). Report included the following information SBAR, Kardex, Intake/Output, MAR, Recent Results and Cardiac Rhythm NSR. Pt denies c/o. S.O.at side. Call bell at side. 0200   Pt called out for extra blanket. No other needs noted. 0409   Pt noted to have temp 102.7. Tylenol 650 mg po given. Sched Ativan 1 mg po given. 0630  Temp decreased to 100.8.  sched IVPB given. 0730  Bedside shift change report given to Olivia Rg (oncoming nurse) by Negro Soni (offgoing nurse). Report included the following information SBAR, Kardex, Intake/Output, MAR, Recent Results and Cardiac Rhythm NSR.

## 2018-11-27 NOTE — PROGRESS NOTES
11/27/18 0730: Bedside and Verbal shift change report given to Zeeshan Leon RN (oncoming nurse) by Lionel Paredes RN (offgoing nurse). Report included the following information SBAR, Kardex, Procedure Summary, Intake/Output, MAR, Accordion, Med Rec Status, Cardiac Rhythm NSR, Alarm Parameters  and Procedure Verification.

## 2018-11-27 NOTE — PROGRESS NOTES
Primary Nurse Todd Tejeda and KAREN Fabian performed a dual skin assessment on this patient No impairment noted Canelo score is 21

## 2018-11-27 NOTE — PROGRESS NOTES
Patient: Jerry Hess MRN: [id number]    Age: 40 y.o. YOB: 1980 Room/Bed: [facility]/ Consent for video monitoring obtained after the below has been explained to the patient/family on 11/26/2018: 
1. They are being monitored continuously in an effort to promote thier safety. 2. That there may be times when the camera will be discontinued to provide care to me to ensure my dignity, such as during bathing or any activity that risks me being exposed. 3. They have the right to opt out of having this surveillance monitoring at any time. 4. It has been explained to the patient/family and they understand that the hospital does not maintain any recording of this surveillance monitoring.    
Carl Mohan RN

## 2018-11-27 NOTE — CONSULTS
Infectious Disease Consult    Today's Date: 11/27/2018   Admit Date: 11/25/2018    Impression:   · Abdominal pain  · Febrile illness--not sure they are related--extensive workup negative to date    Plan:   · Follow up cultures and studies  · Consider GI eval, as well--pancreatitis, mesenteric ischemia (seems too young, but could be anatomic), etc    Anti-infectives:   · Cefepime    Subjective:   Date of Consultation:  November 27, 2018  Referring Physician: Dr Annalise Diop    Patient is a 40 y.o. male admitted with fevers over the past couple of days. He states that he has been having episodes of right flank/lower abdominal pain, stabbing in nature, several times a week for the past 3 months. He has not had any urinary or GI complaints, specifically. His fevers and chills have not been associated with other symptoms other than sweats. He has been started on empiric antibiotic therapy and we are asked to see him in consultation. Patient Active Problem List   Diagnosis Code    Abdominal pain R10.9     History reviewed. No pertinent past medical history. History reviewed. No pertinent family history. Social History     Tobacco Use    Smoking status: Never Smoker    Smokeless tobacco: Never Used   Substance Use Topics    Alcohol use:  Yes     Alcohol/week: 12.0 oz     Types: 20 Cans of beer per week     Past Surgical History:   Procedure Laterality Date    HX RENAL TRANSPLANT      HX UROLOGICAL      left kidney removed  - donated    HX VASECTOMY        Prior to Admission medications    Not on File       No Known Allergies     Review of Systems:  A comprehensive review of systems was negative except for: Constitutional: positive for fevers, chills and sweats  Gastrointestinal: positive for abdominal pain    Objective:     Visit Vitals  /69 (BP 1 Location: Right arm, BP Patient Position: At rest)   Pulse (!) 102   Temp 99.8 °F (37.7 °C)   Resp 24   Ht 5' 7\" (1.702 m)   Wt 90.6 kg (199 lb 11.8 oz) SpO2 100%   BMI 31.28 kg/m²     Temp (24hrs), Av.1 °F (37.8 °C), Min:98.4 °F (36.9 °C), Max:102.7 °F (39.3 °C)       Lines:  Peripheral IV:       Physical Exam:  General:  alert, cooperative, no distress, appears stated age  Lungs:  clear to auscultation bilaterally  Heart:  regular rate and rhythm  Abdomen:  soft, non-tender.  Bowel sounds normal. No masses,  no organomegaly  Skin:  no rash or abnormalities      Data Review:     CBC:  Recent Labs     18  0337 18  1314 18  1641   WBC 4.9 3.1* 4.5   GRANS 74  --  70   MONOS 8  --  9   EOS 1  --  0   ANEU 3.6  --  3.1   ABL 0.8  --  0.9   HGB 14.6 15.0 16.7   HCT 42.0 42.2 48.4   * 110* 143*       BMP:  Recent Labs     18  1314 18  1641   CREA 0.99 1.09   BUN 11 11    137   K 4.0 4.4    103   CO2 27 25   AGAP 8 9   GLU 96 80       LFTS:  Recent Labs     18  1641   TBILI 0.9   ALT 60   SGOT 40*   AP 47   TP 7.4   ALB 3.7       Microbiology:     All Micro Results     Procedure Component Value Units Date/Time    CULTURE, CSF Aure Flatten STAIN [[id number]] Collected:  18 2017    Order Status:  Completed Specimen:  Cerebrospinal Fluid Updated:  18 112     Special Requests: NO SPECIAL REQUESTS        GRAM STAIN NO WBC'S SEEN         NO ORGANISMS SEEN        Culture result: NO GROWTH 2 DAYS       CULTURE, URINE [[id number]] Collected:  18 185    Order Status:  Completed Specimen:  Urine Updated:  18 0726     Special Requests: NO SPECIAL REQUESTS        Clinton Count <1,000 CFU/ML        Culture result: NO GROWTH 2 DAYS       CULTURE, BLOOD, PAIRED [[id number]] Collected:  18 1641    Order Status:  Completed Specimen:  Blood Updated:  18 0631     Special Requests: NO SPECIAL REQUESTS        Culture result: NO GROWTH 2 DAYS       RESPIRATORY PANEL,PCR,NASOPHARYNGEAL [[id number]] Collected:  18 1308    Order Status:  Completed Specimen:  Nasopharyngeal Updated:  18 6062 Adenovirus NOT DETECTED        Coronavirus 229E NOT DETECTED        Coronavirus HKU1 NOT DETECTED        Coronavirus CVNL63 NOT DETECTED        Coronavirus OC43 NOT DETECTED        Metapneumovirus NOT DETECTED        Rhinovirus and Enterovirus NOT DETECTED        Influenza A NOT DETECTED        Influenza A, subtype H1 NOT DETECTED        Influenza A, subtype H3 NOT DETECTED        INFLUENZA A H1N1 PCR NOT DETECTED        Influenza B NOT DETECTED        Parainfluenza 1 NOT DETECTED        Parainfluenza 2 NOT DETECTED        Parainfluenza 3 NOT DETECTED        Parainfluenza virus 4 NOT DETECTED        RSV by PCR NOT DETECTED        Bordetella pertussis - PCR NOT DETECTED        Chlamydophila pneumoniae DNA, QL, PCR NOT DETECTED        Mycoplasma pneumoniae DNA, QL, PCR NOT DETECTED       CHLAMYDIA/GC PCR [[id number]]     Order Status:  Sent Specimen:  Other     CRYPTOCOCCAL AG, CSF W/REFLEX TITER [[id number]] Collected:  11/25/18 2017    Order Status:  Completed Specimen:  Serum Updated:  11/25/18 2213     Cryptococcus Ag, CSF NEGATIVE        CULTURE, BLOOD [[id number]]     Order Status:  Sent Specimen:  Blood     CULTURE, BODY FLUID Osvaldo Lipschutz STAIN [[id number]] Collected:  11/25/18 2017    Order Status:  Canceled Specimen:  Cerebrospinal Fluid Updated:  11/25/18 2039          Imaging:   Reviewed     Signed By: Anh Nguyen MD     November 27, 2018

## 2018-11-27 NOTE — PROGRESS NOTES
2145: TRANSFER - IN REPORT: 
Verbal report received from [facility] (name) on Jeannette Green  being received from [address] (unit) for change in patient condition(Withdrawl monitoring ) Report consisted of patients Situation, Background, Assessment and  
Recommendations(SBAR). Information from the following report(s) SBAR, Kardex, Intake/Output, MAR and Recent Results was reviewed with the receiving nurse. Opportunity for questions and clarification was provided. Assessment completed upon patients arrival to unit and care assumed. 2200: Patient arrived to unit. A&O x4. Calm and cooperative. VSS. Call bell within reach. Primary Nurse Alicia Titus, RN and Elvia Ortiz RN performed a dual skin assessment on this patient No impairment noted Current Bed:  
Total Care SPORT (air with burgundy cover) Canelo score is 21.  
 
2245: Patient requested no information given to his mother and for her not to be allowed in the room. 2330: Bedside and Verbal shift change report given to Shaila JONES  (oncoming nurse) by Maxim Morgan  (offgoing nurse). Report included the following information SBAR, Kardex, Intake/Output, MAR and Recent Results.

## 2018-11-28 LAB
ANA SER QL: NEGATIVE
C TRACH DNA SPEC QL NAA+PROBE: NEGATIVE
LIPASE SERPL-CCNC: 175 U/L (ref 73–393)
N GONORRHOEA DNA SPEC QL NAA+PROBE: NEGATIVE
REAGIN AB CSF QL: NON REACTIVE
SAMPLE TYPE: NORMAL
SERVICE CMNT-IMP: NORMAL
SPECIMEN SOURCE: NORMAL

## 2018-11-28 PROCEDURE — 36415 COLL VENOUS BLD VENIPUNCTURE: CPT

## 2018-11-28 PROCEDURE — 74011250636 HC RX REV CODE- 250/636: Performed by: HOSPITALIST

## 2018-11-28 PROCEDURE — 83690 ASSAY OF LIPASE: CPT

## 2018-11-28 PROCEDURE — 74011250636 HC RX REV CODE- 250/636: Performed by: INTERNAL MEDICINE

## 2018-11-28 PROCEDURE — 65270000032 HC RM SEMIPRIVATE

## 2018-11-28 PROCEDURE — 74011000258 HC RX REV CODE- 258: Performed by: INTERNAL MEDICINE

## 2018-11-28 PROCEDURE — 74011250637 HC RX REV CODE- 250/637: Performed by: INTERNAL MEDICINE

## 2018-11-28 RX ORDER — LORAZEPAM 1 MG/1
1 TABLET ORAL
Status: DISCONTINUED | OUTPATIENT
Start: 2018-11-28 | End: 2018-11-29 | Stop reason: HOSPADM

## 2018-11-28 RX ADMIN — ENOXAPARIN SODIUM 40 MG: 40 INJECTION SUBCUTANEOUS at 01:01

## 2018-11-28 RX ADMIN — Medication 10 ML: at 13:34

## 2018-11-28 RX ADMIN — CEFEPIME HYDROCHLORIDE 2 G: 2 INJECTION, POWDER, FOR SOLUTION INTRAVENOUS at 06:45

## 2018-11-28 RX ADMIN — CEFEPIME HYDROCHLORIDE 2 G: 2 INJECTION, POWDER, FOR SOLUTION INTRAVENOUS at 15:13

## 2018-11-28 RX ADMIN — ENOXAPARIN SODIUM 40 MG: 40 INJECTION SUBCUTANEOUS at 21:43

## 2018-11-28 RX ADMIN — Medication 10 ML: at 06:46

## 2018-11-28 RX ADMIN — SODIUM CHLORIDE 100 ML/HR: 900 INJECTION, SOLUTION INTRAVENOUS at 21:42

## 2018-11-28 RX ADMIN — SODIUM CHLORIDE 100 ML/HR: 900 INJECTION, SOLUTION INTRAVENOUS at 09:11

## 2018-11-28 RX ADMIN — Medication 10 ML: at 21:44

## 2018-11-28 RX ADMIN — LORAZEPAM 1 MG: 1 TABLET ORAL at 06:44

## 2018-11-28 RX ADMIN — CEFEPIME HYDROCHLORIDE 2 G: 2 INJECTION, POWDER, FOR SOLUTION INTRAVENOUS at 21:45

## 2018-11-28 NOTE — PROGRESS NOTES
Problem: Falls - Risk of 
Goal: *Absence of Falls Document Genevive Camera Fall Risk and appropriate interventions in the flowsheet. Outcome: Progressing Towards Goal 
Fall Risk Interventions: 
Mobility Interventions: Assess mobility with egress test, Bed/chair exit alarm, Communicate number of staff needed for ambulation/transfer, Mechanical lift, OT consult for ADLs, Patient to call before getting OOB, PT Consult for mobility concerns, PT Consult for assist device competence, Strengthening exercises (ROM-active/passive), Utilize walker, cane, or other assistive device, Utilize gait belt for transfers/ambulation Medication Interventions: Assess postural VS orthostatic hypotension, Evaluate medications/consider consulting pharmacy, Patient to call before getting OOB, Teach patient to arise slowly History of Falls Interventions: Bed/chair exit alarm, Consult care management for discharge planning, Door open when patient unattended, Evaluate medications/consider consulting pharmacy, Investigate reason for fall, Room close to nurse's station, Utilize gait belt for transfer/ambulation Problem: Pressure Injury - Risk of 
Goal: *Prevention of pressure injury Document Canelo Scale and appropriate interventions in the flowsheet. Outcome: Progressing Towards Goal 
Pressure Injury Interventions: Activity Interventions: Increase time out of bed, Pressure redistribution bed/mattress(bed type), PT/OT evaluation Nutrition Interventions: Document food/fluid/supplement intake Friction and Shear Interventions: HOB 30 degrees or less, Transferring/repositioning devices

## 2018-11-28 NOTE — PROGRESS NOTES
TRANSFER - OUT REPORT: 
 
Verbal report given to Dana(name) on Shirley Salcedo  being transferred to [facility]) for routine progression of care Report consisted of patients Situation, Background, Assessment and  
Recommendations(SBAR). Information from the following report(s) SBAR, Kardex, ED Summary, Procedure Summary, Intake/Output, MAR, Accordion, Recent Results, Med Rec Status and Cardiac Rhythm Sinus Tach was reviewed with the receiving nurse. Lines:  
Peripheral IV 11/25/18 Right Forearm (Active) Site Assessment Clean, dry, & intact 11/27/2018  8:00 PM  
Phlebitis Assessment 0 11/27/2018  8:00 PM  
Infiltration Assessment 0 11/27/2018  8:00 PM  
Dressing Status Clean, dry, & intact 11/27/2018  8:00 PM  
Dressing Type Transparent 11/27/2018  8:00 PM  
Hub Color/Line Status Pink;Flushed;Capped 11/27/2018  8:00 PM  
Action Taken Open ports on tubing capped 11/27/2018  8:00 PM  
Alcohol Cap Used Yes 11/27/2018  8:00 PM  
   
Peripheral IV 11/25/18 Right Antecubital (Active) Site Assessment Clean, dry, & intact 11/27/2018  8:00 PM  
Phlebitis Assessment 0 11/27/2018  8:00 PM  
Infiltration Assessment 0 11/27/2018  8:00 PM  
Dressing Status Clean, dry, & intact 11/27/2018  8:00 PM  
Dressing Type Transparent 11/27/2018  8:00 PM  
Hub Color/Line Status Pink;Flushed;Capped 11/27/2018  8:00 PM  
Action Taken Open ports on tubing capped 11/27/2018  8:00 PM  
Alcohol Cap Used Yes 11/27/2018  8:00 PM  
  
 
Opportunity for questions and clarification was provided. Patient transported with: 
 Monitor Registered Nurse

## 2018-11-28 NOTE — PROGRESS NOTES
ID Progress Note 
2018 Subjective:  
 
Pain is improving, fever curve is better Objective: Antibiotics: 1. Cefepime Vitals:  
Visit Vitals /81 (BP 1 Location: Left arm, BP Patient Position: At rest) Pulse 100 Temp 99.1 °F (37.3 °C) Resp 18 Ht 5' 7\" (1.702 m) Wt 92.6 kg (204 lb 2.3 oz) SpO2 98% BMI 31.97 kg/m² Tmax:  Temp (24hrs), Av.3 °F (37.4 °C), Min:98.8 °F (37.1 °C), Max:100.7 °F (38.2 °C) Exam:  Lungs:  clear to auscultation bilaterally Heart:  regular rate and rhythm Abdomen:  soft, non-tender. Bowel sounds normal. No masses,  no organomegaly Labs:     
Recent Labs  
  18 
3946 18 
1314 18 
1641 WBC 4.9 3.1* 4.5 HGB 14.6 15.0 16.7 * 110* 143* BUN  --  11 11 CREA  --  0.99 1.09  
SGOT  --   --  40* AP  --   --  47  
TBILI  --   --  0.9 Cultures: No results found for: SDES Lab Results Component Value Date/Time Culture result: NO GROWTH 3 DAYS 2018 08:17 PM  
 Culture result: NO GROWTH 2 DAYS 2018 06:52 PM  
 Culture result: NO GROWTH 3 DAYS 2018 04:41 PM  
 
 
Radiology:  
 
Line/Insert Date:        
 
Assessment: 1. Abdominal pain of uncertain etiology 2. Febrile illness Objective: 1. Continue current therapy 2. Home soon on no antibiotics if afebrile Amadeo Hickey MD

## 2018-11-28 NOTE — PROGRESS NOTES
Nia Juwan TRANSFER - IN REPORT: 
 
Verbal report received from [address], RN(name) on Ginna Ken  being received from [facility](unit) for routine progression of care Report consisted of patients Situation, Background, Assessment and  
Recommendations(SBAR). Information from the following report(s) SBAR, Kardex, Intake/Output, MAR, Accordion, Recent Results and Cardiac Rhythm Sinus Tach was reviewed with the receiving nurse. Opportunity for questions and clarification was provided. Assessment completed upon patients arrival to unit and care assumed. Primary Nurse Srinivas Bundy and Odalys Prince RN performed a dual skin assessment on this patient Impairment noted- Patient has dry feet bilaterally and a red rash over his arms and trunk.  
Canelo score is 21

## 2018-11-28 NOTE — PROGRESS NOTES
Bedside shift change report given to Cammy Sacks, RN (oncoming nurse) by Melinda Molina RN (offgoing nurse). Report included the following information SBAR, Kardex, MAR, Accordion, Recent Results and Cardiac Rhythm Sinus Tach.

## 2018-11-28 NOTE — CONSULTS
[address]    [address] [address]        GASTROENTEROLOGY CONSULTATION NOTE  Will Mallorie Hendricks  [phone] office  [phone] NP/PA in-hospital cell phone M-F until 4:30PM  After 5PM or on weekends, please call  for physician on call        NAME:  Emanuel Ba   :   1980   MRN:   [id number]       Referring Physician: Dr. Natalia Cortez Date: 2018 11:22 AM    Chief Complaint: abdominal pain     History of Present Illness:  Patient is a 40 y.o. who is seen in consultation at the request of Dr. Omar Melendez for abdominal pain. Patient presented to the ED with complaints of fever x 3 months, body aches, and vomiting. He was admitted to the hospital on 18 for recurrent fever associated with abdominal pain, joint pains, tingling, and numbness of unknown etiology. Patient reports a 3-4 month history of intermittent right sided lower abdominal pain that he describes as a sharp, stabbing sensation. Pain would resolve after 1-3 hours. No clear aggravating or alleviating factors. He reports no pain in quite some time (unable to quantify). He complains of intermittent nausea. No reflux or vomiting. No change in bowel habits, diarrhea, or constipation. He has 3-5 bowel movements daily that are formed, brown, and easy to pass. No melena or hematochezia. No NSAIDs. No anticoagulation.  + Alcohol use (18 beers/week). Denies tobacco or drug use. History of nephrectomy. No history of EGD or colonoscopy. I have reviewed the emergency room note, hospital admission note, notes by all other clinicians who have seen the patient during this hospitalization to date. I have reviewed the problem list and the reason for this hospitalization. I have reviewed the allergies and the medications the patient was taking at home prior to this hospitalization. PMH:  History reviewed. No pertinent past medical history.     PSH:  Past Surgical History:   Procedure Laterality Date    HX RENAL TRANSPLANT      HX UROLOGICAL      left kidney removed  - donated    HX VASECTOMY         Allergies:  No Known Allergies    Home Medications:  None       Hospital Medications:  Current Facility-Administered Medications   Medication Dose Route Frequency    LORazepam (ATIVAN) tablet 1 mg  1 mg Oral Q8H PRN    cefepime (MAXIPIME) 2 g in 0.9% sodium chloride (MBP/ADV) 100 mL  2 g IntraVENous Q8H    sodium chloride (NS) flush 5-10 mL  5-10 mL IntraVENous Q8H    sodium chloride (NS) flush 5-10 mL  5-10 mL IntraVENous PRN    enoxaparin (LOVENOX) injection 40 mg  40 mg SubCUTAneous Q24H    0.9% sodium chloride infusion  100 mL/hr IntraVENous CONTINUOUS       Social History:  Social History     Tobacco Use    Smoking status: Never Smoker    Smokeless tobacco: Never Used   Substance Use Topics    Alcohol use: Yes     Alcohol/week: 12.0 oz     Types: 20 Cans of beer per week       Family History:  History reviewed. No pertinent family history. Review of Systems:  Constitutional: + fever, negative chills, negative weight loss  Eyes:   negative visual changes  ENT:   negative sore throat, tongue or lip swelling  Respiratory:  negative cough, negative dyspnea  Cards:  negative for chest pain, palpitations, lower extremity edema  GI:   See HPI  :  negative for frequency, dysuria  Integument:  negative for rash and pruritus  Heme:  negative for easy bruising and gum/nose bleeding  Musculoskeletal:negative for myalgias, + back pain, negative muscle weakness  Neuro:    + for headaches, negative dizziness  Psych: negative for feelings of anxiety, depression     Objective:     Patient Vitals for the past 8 hrs:   BP Temp Pulse Resp SpO2 Weight   11/28/18 0748 124/78 98.9 °F (37.2 °C) 94 18 96 %    11/28/18 0402 119/75 99 °F (37.2 °C) 92 23 96 % 92.6 kg (204 lb 2.3 oz)     No intake/output data recorded.   11/26 1901 - 11/28 0700  In: 5253.3 [P.O.:480; I.V.:4773.3]  Out: 7564 [Urine:4975]    EXAM:     CONST:  Lying in bed, asleep on initial exam, no acute distress   NEURO:  Alert and oriented x 3   HEENT: EOMI, no scleral icterus   LUNGS: CTA bilaterally anteriorly   CARD:  S1 S2, regular rate and rhythm   ABD:  Soft, non distended, no tenderness, no rebound, no guarding. + Bowel sounds. EXT:  Warm   PSYCH: Not anxious or agitated     Data Review     Recent Labs     11/27/18  0337 11/26/18  1314   WBC 4.9 3.1*   HGB 14.6 15.0   HCT 42.0 42.2   * 110*     Recent Labs     11/26/18  1314 11/25/18  1641    137   K 4.0 4.4    103   CO2 27 25   BUN 11 11   CREA 0.99 1.09   GLU 96 80   CA 8.2* 8.5     Recent Labs     11/28/18  0431 11/25/18  1641   SGOT  --  40*   AP  --  47   TP  --  7.4   ALB  --  3.7   GLOB  --  3.7   LPSE 175  --      Recent Labs     11/25/18  1641   INR 1.0   PTP 10.4   APTT 29.9       11/25/18  INDICATION: abdominal pain      EXAM: CT Abdomen and Pelvis is performed with 100 mL Isovue 370 contrast IV  without complication. CT dose reduction was achieved through use of a  standardized protocol tailored for this examination and automatic exposure  control for dose modulation.     FINDINGS:   There is no inflammation, ascites, pneumoperitoneum or significant adenopathy. Liver shows no significant finding. Bile ducts are not enlarged. Pancreas shows  no mass or inflammation. Spleen is unremarkable. Adrenal glands are normal in  size.     Left kidney is absent. Right kidney shows no mass or hydronephrosis. Aorta is  without aneurysm.      The appendix is normal. The bladder is not distended. The distal ureters are not  dilated. There is no apparent pelvic mass.     IMPRESSION  IMPRESSION: No Acute Disease. Assessment:   · Abdominal pain, resolved: WBC 4.9, Hgb 14.6, LFTs unremarkable, lipase normal, lactic acid: 1.6. CT abdomen/pelvis with IV contrast (11/25/18); no acute process. Abdominal exam is benign.   · Fever, unknown origin: negative workup to date; ID following. · Urine drug screen positive for amphetamine and opioids     Patient Active Problem List   Diagnosis Code    Abdominal pain R10.9     Plan:     · Fever workup per ID  · Follow up in the office as an outpatient for recurrent GI symptoms and further workup. · Thank you for allowing me to participate in care of Annamaria Drew     Signed By: Domenic Melton     11/28/2018  11:22 AM     I have examined the patient. I have reviewed the chart and agree with the documentation recorded by the NP, including the assessment, treatment plan, and disposition.   No GI explanation to his fever, his pain resolved and abdominal imaging normal  Will sign off  D/c home per primary team    Hair Pavon MD

## 2018-11-28 NOTE — PROGRESS NOTES
Primary Nurse Haresh Pabon and Tal Ford, KAREN performed a dual skin assessment on this patient No impairment noted Canelo score is 23 Tattoo both side of shoulders

## 2018-11-28 NOTE — PROGRESS NOTES
Hospitalist Progress Note Maddy Aldrich MD 
Answering service: [phone] or [phone] from in house phone Date of Service:  2018 NAME:  Johana Estevez :  1980 MRN:  [id number] Admission Summary:  
 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 Patient is not a good historian. His girlfriend is sitting with him. According to the patient, he is having lower abdominal pain with off and on fevers for a while. He was diagnosed with epididymo orchitis in the emergency room and was given a 10-day course of Levaquin, which according to him, did not help. Interval history / Subjective:  
He did not report subjective fever. Temp was 100.7 around midnight. No other complaints. He and hsi girlfriend said he does not drink much and not worried about going through withdrawal  
 
Assessment & Plan:  
 
Fever,unknown origin Drug induced vs. Rheumatologic process. Says has been ongoing for 3 days, but has been having \"pains\" for a few months.  
-Blood,urine,CSF cultures negative- HIV negative - LP  benign, HSV,Enterovirus neg, some labs still pending 
- Send PCR viral swab, GC/C urine, 
-Hep C ab neg,  RPR neg,Resp Panel unremarkable - ESR neg 
-RF negative 
-TTE no evidence of vegetation 
- Histo sent  
-- GIBSON pending UDS + Amphetamine and opioids.  
- Patient continues to deny using any illicit drugs, but stated he has been \"on and off\" adderol.  
-  reviewed by previous Hospitalist, patient is not prescribed any opioids regularly, and no amphetamine such as aderrol has ever been filled. Last received 8 pills of tramadol in the beginning of the month. Girlfriend said they were given vicodin in [facility], but none was filled according to . RLE swelling - ? If related to rheumotologic diagnosis - Sent Dopplers - neg for DVT -  F/U GIBSON. RF negative No evidence of alcohol withdrawal.CIWA low. D/c ativan Code status: FULL 
DVT prophylaxis: Lovenox Care Plan discussed with: Patient/Family Disposition: Home w/Family and TBD Transfer to medical. 
 
Dispo: if he remains afebrile for the next 24 hours,he may be discharged and followed as out patient. Hospital Problems  Date Reviewed: 11/25/2018 Codes Class Noted POA * (Principal) Abdominal pain ICD-10-CM: R10.9 ICD-9-CM: 789.00  11/25/2018 Unknown Review of Systems: A comprehensive review of systems was negative except for that written in the HPI. Vital Signs:  
 Last 24hrs VS reviewed since prior progress note. Most recent are: 
Visit Vitals /78 (BP 1 Location: Right arm, BP Patient Position: At rest) Pulse 94 Temp 98.9 °F (37.2 °C) Resp 18 Ht 5' 7\" (1.702 m) Wt 92.6 kg (204 lb 2.3 oz) SpO2 96% BMI 31.97 kg/m² Intake/Output Summary (Last 24 hours) at 11/28/2018 1040 Last data filed at 11/28/2018 7051 Gross per 24 hour Intake 2011.67 ml Output 3875 ml Net -1863.33 ml Physical Examination:  
 
 
     
Constitutional:  No acute distress, cooperative, pleasant, laying in bed ENT:  Oral mucous moist, oropharynx benign. Neck supple, Resp:  CTA bilaterally. No wheezing/rhonchi/rales. No accessory muscle use CV:  Regular rhythm, normal rate, no murmurs, gallops, rubs GI:  Soft, non distended, non tender. normoactive bowel sounds, no hepatosplenomegaly Musculoskeletal:  No edema, warm, 2+ pulses throughout Neurologic:  Moves all extremities. AAOx3, CN II-XII reviewed Skin:  Swollen Finger Joints, multiple tattoos , no rashes noted Data Review:  
Imaging and laboratory data reviewed. Labs:  
 
Recent Labs  
  11/27/18 
8067 11/26/18 
1314 WBC 4.9 3.1* HGB 14.6 15.0  
HCT 42.0 42.2 * 110* Recent Labs  
  11/26/18 
1314 11/25/18 
1641  137  
K 4.0 4.4  103 CO2 27 25 BUN 11 11 CREA 0.99 1.09  
GLU 96 80  
CA 8.2* 8.5 Recent Labs  
  11/28/18 
0431 11/25/18 
1641 SGOT  --  40* ALT  --  60  
AP  --  47  
TBILI  --  0.9 TP  --  7.4 ALB  --  3.7 GLOB  --  3.7 LPSE 175  --   
 
Recent Labs  
  11/25/18 
1641 INR 1.0 PTP 10.4 APTT 29.9 No results for input(s): FE, TIBC, PSAT, FERR in the last 72 hours. No results found for: FOL, RBCF No results for input(s): PH, PCO2, PO2 in the last 72 hours. Recent Labs  
  11/25/18 
1641  TROIQ <0.05 No results found for: CHOL, CHOLX, CHLST, CHOLV, HDL, LDL, LDLC, DLDLP, TGLX, TRIGL, TRIGP, CHHD, CHHDX Lab Results Component Value Date/Time Glucose (POC) 87 11/25/2018 04:38 PM  
 Glucose (POC) 104 06/21/2010 10:53 PM  
 
Lab Results Component Value Date/Time Color YELLOW/STRAW 11/25/2018 06:52 PM  
 Appearance CLEAR 11/25/2018 06:52 PM  
 Specific gravity 1.024 11/25/2018 06:52 PM  
 Specific gravity 1.025 09/01/2018 01:45 AM  
 pH (UA) 6.5 11/25/2018 06:52 PM  
 Protein NEGATIVE  11/25/2018 06:52 PM  
 Glucose NEGATIVE  11/25/2018 06:52 PM  
 Ketone NEGATIVE  11/25/2018 06:52 PM  
 Bilirubin NEGATIVE  11/25/2018 06:52 PM  
 Urobilinogen 0.2 11/25/2018 06:52 PM  
 Nitrites NEGATIVE  11/25/2018 06:52 PM  
 Leukocyte Esterase NEGATIVE  11/25/2018 06:52 PM  
 Epithelial cells FEW 11/25/2018 06:52 PM  
 Bacteria NEGATIVE  11/25/2018 06:52 PM  
 WBC 0-4 11/25/2018 06:52 PM  
 RBC  11/25/2018 06:52 PM  
 
 
 
Medications Reviewed:  
 
Current Facility-Administered Medications Medication Dose Route Frequency  ketorolac (TORADOL) injection 30 mg  30 mg IntraVENous Q6H PRN  
 LORazepam (ATIVAN) tablet 1 mg  1 mg Oral Q6H  
 [START ON 11/29/2018] LORazepam (ATIVAN) tablet 1 mg  1 mg Oral Q8H  
 diazePAM (VALIUM) injection 5 mg  5 mg IntraVENous Q1H PRN  
 cefepime (MAXIPIME) 2 g in 0.9% sodium chloride (MBP/ADV) 100 mL  2 g IntraVENous Q8H  
  sodium chloride (NS) flush 5-10 mL  5-10 mL IntraVENous Q8H  
 sodium chloride (NS) flush 5-10 mL  5-10 mL IntraVENous PRN  
 enoxaparin (LOVENOX) injection 40 mg  40 mg SubCUTAneous Q24H  
 acetaminophen (TYLENOL) tablet 650 mg  650 mg Oral Q6H PRN  
 0.9% sodium chloride infusion  100 mL/hr IntraVENous CONTINUOUS  
 
______________________________________________________________________ EXPECTED LENGTH OF STAY: 2d 19h ACTUAL LENGTH OF STAY:          3 Yuki Bowman MD

## 2018-11-28 NOTE — PROGRESS NOTES
Problem: Falls - Risk of 
Goal: *Absence of Falls Document Esther Childs Fall Risk and appropriate interventions in the flowsheet. Fall Risk Interventions: 
History of Falls Interventions: Evaluate medications/consider consulting pharmacy. Patient to call before getting out of bed. Problem: Pressure Injury - Risk of 
Goal: *Prevention of pressure injury Document Canelo Scale and appropriate interventions in the flowsheet. Outcome: Progressing Towards Goal 
Pressure Injury Interventions: Activity Interventions: Increase time out of bed Nutrition Interventions: Document food/fluid/supplement intake Friction and Shear Interventions: HOB 30 degrees or less

## 2018-11-28 NOTE — PROGRESS NOTES
Problem: Risk for Spread of Infection Goal: Prevent transmission of infectious organism to others Prevent the transmission of infectious organisms to other patients, staff members, and visitors. Outcome: Progressing Towards Goal 
Hand hygiene is strictly being used in order to prevent the spread of infection Problem: Pain Goal: *Control of Pain Outcome: Progressing Towards Goal 
Patient has reported no carmen. Pain will continue to be monitored

## 2018-11-28 NOTE — PROGRESS NOTES
.. TRANSFER - IN REPORT: 
 
Verbal report received from [facility] (name) on Tyrese Connor  being received from [facility] 4E (unit) for routine progression of care Report consisted of patients Situation, Background, Assessment and  
Recommendations(SBAR). Information from the following report(s) SBAR, Kardex and MAR was reviewed with the receiving nurse. Opportunity for questions and clarification was provided. Assessment completed upon patients arrival to unit and care assumed.

## 2018-11-28 NOTE — PROGRESS NOTES
TRANSFER - OUT REPORT: 
 
Verbal report given to KAREN Kevin(name) on Coretta Shetty  being transferred to [facility](unit) for routine progression of care Report consisted of patients Situation, Background, Assessment and  
Recommendations(SBAR). Information from the following report(s) SBAR, Kardex, Intake/Output, Recent Results and Cardiac Rhythm NSR/ST was reviewed with the receiving nurse. Opportunity for questions and clarification was provided.

## 2018-11-29 VITALS
TEMPERATURE: 97.6 F | RESPIRATION RATE: 18 BRPM | HEART RATE: 101 BPM | HEIGHT: 67 IN | SYSTOLIC BLOOD PRESSURE: 133 MMHG | OXYGEN SATURATION: 100 % | BODY MASS INDEX: 32.04 KG/M2 | WEIGHT: 204.15 LBS | DIASTOLIC BLOOD PRESSURE: 91 MMHG

## 2018-11-29 PROBLEM — F15.10 AMPHETAMINE ABUSE (HCC): Status: ACTIVE | Noted: 2018-11-29

## 2018-11-29 PROBLEM — R50.9 FEVER: Status: ACTIVE | Noted: 2018-11-29

## 2018-11-29 LAB
DISCLAIMER:, 130261: NORMAL
HISTOPLASMA AG, UR,HAGU: <0.5

## 2018-11-29 PROCEDURE — 74011000258 HC RX REV CODE- 258: Performed by: INTERNAL MEDICINE

## 2018-11-29 PROCEDURE — 74011250636 HC RX REV CODE- 250/636: Performed by: INTERNAL MEDICINE

## 2018-11-29 RX ADMIN — Medication 10 ML: at 06:00

## 2018-11-29 RX ADMIN — CEFEPIME HYDROCHLORIDE 2 G: 2 INJECTION, POWDER, FOR SOLUTION INTRAVENOUS at 07:00

## 2018-11-29 NOTE — DISCHARGE SUMMARY
Discharge Summary       PATIENT ID: Aditya Thurman  MRN: [id number]   YOB: 1980    DATE OF ADMISSION: 11/25/2018  4:29 PM    DATE OF DISCHARGE: 11/29/2018  PRIMARY CARE PROVIDER: None     ATTENDING PHYSICIAN: Tomasa Merida MD  DISCHARGING PROVIDER: Tomasa Merida MD    To contact this individual call [phone] and ask the  to page. If unavailable ask to be transferred the Adult Hospitalist Department. CONSULTATIONS: IP CONSULT TO INFECTIOUS DISEASES  IP CONSULT TO INFECTIOUS DISEASES  IP CONSULT TO GASTROENTEROLOGY    PROCEDURES/SURGERIES: * No surgery found *    ADMITTING DIAGNOSES & HOSPITAL COURSE:   Fever without a source  Abdominal pain       DISCHARGE DIAGNOSES / PLAN:      Fever,unknown origin Drug induced vs. Rheumatologic process. Says has been ongoing for 3 days, but has been having \"pains\" for a few months. More likely drug induced given GIBSON neg, RF neg. - Blood,urine,CSF cultures negative- HIV negative  - LP 11/25 benign, HSV,Enterovirus neg,   - GC/C urine negative  - Hep C ab neg,  RPR neg, Resp Panel unremarkable  - ESR neg  - RF negative  - TTE no evidence of vegetation  - Histo sent  and pending. Will need to follow up with PCP   -- GIBSON negative  - ID consulted, s/p cefepime. Per their recommendations discharge off antibiotics.         UDS + Amphetamine and opioids.   - Patient continues to deny using any illicit drugs, but stated he has been \"on and off\" adderol.   -  reviewed by previous Hospitalist, patient is not prescribed any opioids regularly, and no amphetamine such as aderrol has ever been filled. Last received 8 pills of tramadol in the beginning of the month. Girlfriend said they were given vicodin in [facility], but none was filled according to .      RLE swelling - ? If related to rheumotologic diagnosis  - Sent Dopplers - neg for DVT  -  GIBSON. RF negative     No evidence of alcohol withdrawal.CIWA low.     Code status: FULL  DVT prophylaxis: Lovenox     Care Plan discussed with: Patient/Family  Disposition: Home w/Family and TBD          PENDING TEST RESULTS:   At the time of discharge the following test results are still pending: Histoplasmosis Ag urine      FOLLOW UP APPOINTMENTS:    Follow-up Information     Follow up With Specialties Details Why Contact Info    You should find a PCP under your insurance. Another option is [facility] which is a free clinic in [place]  In 2 weeks             [address]:   1. We have not started any new medications   2. Please make sure to follow up with your primary care physician within 1-2 weeks of discharge for hospital follow up. You should find one on your insurance website, or you can go to the free clinic at [facility]. 3. You had a fever of unknown source. You had an extensive workup which did not reveal a source of infection. You have one test still pending, and you should have your primary care follow up those results. DIET: Regular Diet  ACTIVITY: Activity as tolerated    WOUND CARE: None    EQUIPMENT needed: None      DISCHARGE MEDICATIONS:  There are no discharge medications for this patient. NOTIFY YOUR PHYSICIAN FOR ANY OF THE FOLLOWING:   Fever over 101 degrees for 24 hours. Chest pain, shortness of breath, fever, chills, nausea, vomiting, diarrhea, change in mentation, falling, weakness, bleeding. Severe pain or pain not relieved by medications. Or, any other signs or symptoms that you may have questions about.     DISPOSITION:  X  Home With:   OT  PT  HH  RN       Long term SNF/Inpatient Rehab    Independent/assisted living    Hospice    Other:       PATIENT CONDITION AT DISCHARGE:     Functional status    Poor     Deconditioned    X Independent      Cognition    X Lucid     Forgetful     Dementia      Catheters/lines (plus indication)    Alvarado     PICC     PEG    X None      Code status    X Full code     DNR      PHYSICAL EXAMINATION AT DISCHARGE:       Visit Vitals  BP (!) 133/91 (BP 1 Location: Left arm, BP Patient Position: At rest;Supine)   Pulse (!) 101   Temp 97.6 °F (36.4 °C)   Resp 18   Ht 5' 7\" (1.702 m)   Wt 92.6 kg (204 lb 2.3 oz)   SpO2 100%   BMI 31.97 kg/m²                                                     Constitutional:  No acute distress, cooperative, pleasant, laying in bed    ENT:  Oral mucous moist, oropharynx benign. Neck supple,    Resp:  CTA bilaterally. No wheezing/rhonchi/rales. No accessory muscle use   CV:  Regular rhythm, normal rate, no murmurs, gallops, rubs    GI:  Soft, non distended, non tender. normoactive bowel sounds, no hepatosplenomegaly     Musculoskeletal:  No edema, warm, 2+ pulses throughout    Neurologic:  Moves all extremities.   AAOx3, CN II-XII reviewed                   CHRONIC MEDICAL DIAGNOSES:  Problem List as of 11/29/2018 Date Reviewed: 11/29/2018          Codes Class Noted - Resolved    Fever ICD-10-CM: R50.9  ICD-9-CM: 780.60  11/29/2018 - Present        Amphetamine abuse ([facility] [address] ICD-10-CM: F15.10  ICD-9-CM: 305.70  11/29/2018 - Present        * (Principal) Abdominal pain ICD-10-CM: R10.9  ICD-9-CM: 789.00  11/25/2018 - Present              Greater than 30 minutes were spent with the patient on counseling and coordination of care    Signed:   Salazar Pedraza MD  11/29/2018  9:39 AM

## 2018-11-29 NOTE — DISCHARGE INSTRUCTIONS
Discharge Instructions       PATIENT ID: Lucy Cash  MRN: [id number]   YOB: 1980    DATE OF ADMISSION: 11/25/2018  4:29 PM    DATE OF DISCHARGE: 11/29/2018    PRIMARY CARE PROVIDER: None     ATTENDING PHYSICIAN: Bob Chan*  DISCHARGING PROVIDER: Cruz Crystal MD    To contact this individual call [phone] and ask the  to page. If unavailable ask to be transferred the Adult Hospitalist Department. DISCHARGE DIAGNOSES   Fever without a source  Abdominal pain     CONSULTATIONS: IP CONSULT TO INFECTIOUS DISEASES  IP CONSULT TO INFECTIOUS DISEASES  IP CONSULT TO GASTROENTEROLOGY    PROCEDURES/SURGERIES: * No surgery found *    PENDING TEST RESULTS:   At the time of discharge the following test results are still pending: Histoplasmosis Ag urine    FOLLOW UP APPOINTMENTS:   Follow-up Information     Follow up With Specialties Details Why Contact Info    You should find a PCP under your insurance. Another option is [facility] which is a free clinic in [place]  In 2 weeks               [address]:   1. We have not started any new medications   2. Please make sure to follow up with your primary care physician within 1-2 weeks of discharge for hospital follow up. You should find one on your insurance website, or you can go to the free clinic at [facility]. 3. You had a fever of unknown source. You had an extensive workup which did not reveal a source of infection. You have one test still pending, and you should have your primary care follow up those results. DIET: Regular Diet  ACTIVITY: Activity as tolerated    WOUND CARE: None    EQUIPMENT needed: None        DISCHARGE MEDICATIONS:   See Medication Reconciliation Form    · It is important that you take the medication exactly as they are prescribed.    · Keep your medication in the bottles provided by the pharmacist and keep a list of the medication names, dosages, and times to be taken in your wallet. · Do not take other medications without consulting your doctor. NOTIFY YOUR PHYSICIAN FOR ANY OF THE FOLLOWING:   Fever over 101 degrees for 24 hours. Chest pain, shortness of breath, fever, chills, nausea, vomiting, diarrhea, change in mentation, falling, weakness, bleeding. Severe pain or pain not relieved by medications. Or, any other signs or symptoms that you may have questions about. DISPOSITION:   X Home With:   OT  PT  HH  RN       SNF/Inpatient Rehab/LTAC    Independent/assisted living    Hospice    Other:     CDMP Checked: Yes X     PROBLEM LIST Updated:   Yes X       Signed:   Boone Dos Santos MD  11/29/2018  9:47 AM

## 2018-11-29 NOTE — PROGRESS NOTES
..Bedside shift change report given to Hyacinth Sutton RN (oncoming nurse) by Domenico Rocha RN (offgoing nurse). Report included the following information SBAR, Kardex and MAR.

## 2018-11-29 NOTE — PROGRESS NOTES
Spiritual Care Partner Volunteer visited patient in room 620 on 11.29.18. Documented by: : Rev. Macey Kruger. Marcus Juan; [facility], to contact [phone] [address] call: [phone]

## 2018-11-29 NOTE — PROGRESS NOTES
Bedside and Verbal shift change report given to Nazia RN (oncoming nurse) by Cecil Lundberg (offgoing nurse). Report included the following information SBAR.

## 2018-11-29 NOTE — PROGRESS NOTES
Bedside shift change report given to christal (oncoming nurse) by Katerine Farris (offgoing nurse). Report included the following information SBAR, Kardex, MAR and Recent Results.

## 2018-11-29 NOTE — PROGRESS NOTES
Problem: Falls - Risk of 
Goal: *Absence of Falls Document Kenny Martinez Fall Risk and appropriate interventions in the flowsheet. Outcome: Progressing Towards Goal 
Fall Risk Interventions: 
Mobility Interventions: Assess mobility with egress test, Bed/chair exit alarm, Communicate number of staff needed for ambulation/transfer, Mechanical lift, OT consult for ADLs, Patient to call before getting OOB, PT Consult for mobility concerns, PT Consult for assist device competence, Strengthening exercises (ROM-active/passive), Utilize walker, cane, or other assistive device, Utilize gait belt for transfers/ambulation Medication Interventions: Evaluate medications/consider consulting pharmacy History of Falls Interventions: Bed/chair exit alarm, Consult care management for discharge planning, Door open when patient unattended, Evaluate medications/consider consulting pharmacy, Investigate reason for fall, Room close to nurse's station, Utilize gait belt for transfer/ambulation

## 2018-11-30 LAB
BACTERIA SPEC CULT: NORMAL
SERVICE CMNT-IMP: NORMAL

## 2018-12-02 LAB
BACTERIA SPEC CULT: NORMAL
BACTERIA SPEC CULT: NORMAL
GRAM STN SPEC: NORMAL
GRAM STN SPEC: NORMAL
SERVICE CMNT-IMP: NORMAL

## 2020-11-11 ENCOUNTER — HOSPITAL ENCOUNTER (EMERGENCY)
Age: 40
Discharge: LEFT BEFORE DISCHARGE | End: 2020-11-11

## 2020-11-11 ENCOUNTER — HOSPITAL ENCOUNTER (EMERGENCY)
Age: 40
Discharge: HOME | End: 2020-11-11
Attending: STUDENT IN AN ORGANIZED HEALTH CARE EDUCATION/TRAINING PROGRAM
Payer: COMMERCIAL

## 2020-11-11 VITALS
SYSTOLIC BLOOD PRESSURE: 146 MMHG | RESPIRATION RATE: 14 BRPM | HEART RATE: 91 BPM | DIASTOLIC BLOOD PRESSURE: 95 MMHG | WEIGHT: 192.9 LBS | HEIGHT: 67 IN | OXYGEN SATURATION: 99 % | TEMPERATURE: 98.7 F | BODY MASS INDEX: 30.28 KG/M2

## 2020-11-11 DIAGNOSIS — Z20.822 SUSPECTED COVID-19 VIRUS INFECTION: Primary | ICD-10-CM

## 2020-11-11 PROCEDURE — 75810000275 HC EMERGENCY DEPT VISIT NO LEVEL OF CARE

## 2020-11-11 PROCEDURE — 87635 SARS-COV-2 COVID-19 AMP PRB: CPT

## 2020-11-11 PROCEDURE — 99282 EMERGENCY DEPT VISIT SF MDM: CPT

## 2020-11-11 NOTE — ED NOTES
Discharge instructions reviewed with pt and copy given by this RN. Pt educated on follow up with PCP in 3 days. Pt verbalized understanding of all follow up with all instructions of symptom management and quarantine at home. Pt ambulatory from ED in no sign of distress or discomfort.

## 2020-11-11 NOTE — ED TRIAGE NOTES
Pt reports fever as high as 102 F x3 days. Pt reports taking dayquil this am. Pt also reports intermittent SOB and productive cough with green sputum since Monday.

## 2020-11-11 NOTE — ED PROVIDER NOTES
The history is provided by the patient. Flu   This is a new problem. The current episode started more than 2 days ago (3 days ago). The problem occurs constantly. The problem has not changed since onset. The cough is non-productive. Patient reports a subjective fever - was not measured. The fever has been present for 1 - 2 days. Associated symptoms include chills, sweats, headaches, sore throat and myalgias. Pertinent negatives include no chest pain, no shortness of breath, no wheezing and no vomiting. He has tried nothing for the symptoms. The treatment provided no relief. His past medical history does not include pneumonia or asthma. Past medical history comments: donated a kidney, not on immunosuppresants. History reviewed. No pertinent past medical history. Past Surgical History:   Procedure Laterality Date    HX NEPHROSTOMY      HX RENAL TRANSPLANT      HX UROLOGICAL      left kidney removed  - donated    HX VASECTOMY           History reviewed. No pertinent family history. Social History     Socioeconomic History    Marital status: LEGALLY      Spouse name: Not on file    Number of children: Not on file    Years of education: Not on file    Highest education level: Not on file   Occupational History    Not on file   Social Needs    Financial resource strain: Not on file    Food insecurity     Worry: Not on file     Inability: Not on file    Transportation needs     Medical: Not on file     Non-medical: Not on file   Tobacco Use    Smoking status: Never Smoker    Smokeless tobacco: Never Used   Substance and Sexual Activity    Alcohol use:  Yes     Alcohol/week: 20.0 standard drinks     Types: 20 Cans of beer per week    Drug use: No    Sexual activity: Not on file   Lifestyle    Physical activity     Days per week: Not on file     Minutes per session: Not on file    Stress: Not on file   Relationships    Social connections     Talks on phone: Not on file     Gets together: Not on file     Attends Jehovah's witness service: Not on file     Active member of club or organization: Not on file     Attends meetings of clubs or organizations: Not on file     Relationship status: Not on file    Intimate partner violence     Fear of current or ex partner: Not on file     Emotionally abused: Not on file     Physically abused: Not on file     Forced sexual activity: Not on file   Other Topics Concern    Not on file   Social History Narrative    Not on file         ALLERGIES: Patient has no known allergies. Review of Systems   Constitutional: Positive for chills. HENT: Positive for sore throat. Respiratory: Negative for shortness of breath and wheezing. Cardiovascular: Negative for chest pain. Gastrointestinal: Negative for vomiting. Genitourinary: Negative for dysuria. Musculoskeletal: Positive for myalgias. Skin: Negative for rash. Neurological: Positive for headaches. All other systems reviewed and are negative. Vitals:    11/11/20 1300   BP: (!) 142/99   Pulse: 91   Resp: 14   Temp: 98.7 °F (37.1 °C)   SpO2: 97%   Weight: 87.5 kg (192 lb 14.4 oz)   Height: 5' 7\" (1.702 m)            Physical Exam  Vitals signs and nursing note reviewed. Constitutional:       General: He is not in acute distress. Appearance: He is well-developed. HENT:      Head: Normocephalic and atraumatic. Eyes:      Conjunctiva/sclera: Conjunctivae normal.   Neck:      Musculoskeletal: Normal range of motion and neck supple. Cardiovascular:      Rate and Rhythm: Normal rate and regular rhythm. Pulmonary:      Effort: Pulmonary effort is normal. No respiratory distress. Abdominal:      General: Abdomen is flat. There is no distension. Musculoskeletal:      Right lower leg: No edema. Left lower leg: No edema. Skin:     General: Skin is warm and dry. Neurological:      Mental Status: He is alert and oriented to person, place, and time.       Motor: No abnormal muscle tone.          MDM       Procedures    The patient is resting comfortably and is alert and in no distress. On re-examination the patient does not appear toxic and has no meningeal signs, and there is no intractable vomiting, no respiratory distress and no apparent pain. Based on the history, exam, diagnostic testing (if any) and reassessment, the patient has no signs of meningitis, significant pneumonia, pyelonephritis, cellulitis, sepsis or other acute serious bacterial infections, or other significant pathology to warrant further testing, continued ED treatment, admission or specialist evaluation. The patient's vital signs have been stable. Will swab for COVID, isolation precautions provided until test results. The patient's condition is stable and is appropriate for discharge. The patient or caregiver will pursue further outpatient evaluation with the primary care physician or other designated or consulting physician as indicated in the discharge instructions.

## 2020-11-11 NOTE — LETTER
[facility] EMERGENCY [address] [facility] [address] 
[phone] Work/School Note Date: 11/11/2020 To Whom It May concern: 
 
Kareem Groves was evaulated by the following provider(s): 
Attending Provider: Enedelia Florence MD.   [address] virus is suspected. Per the CDC guidelines we recommend home isolation until the following conditions are all met: 1. At least 10 days have passed since symptoms first appeared and 2. At least 24 hours have passed since last fever without the use of fever-reducing medications and 
3. Symptoms (e.g., cough, shortness of breath) have improved Sincerely, 
 
 
 
 
Josselyn Nazario MD

## 2020-11-12 ENCOUNTER — PATIENT OUTREACH (OUTPATIENT)
Dept: CASE MANAGEMENT | Age: 40
End: 2020-11-12

## 2020-11-12 LAB
COVID-19, XGCOVT: NOT DETECTED
SOURCE, COVRS: NORMAL
SPECIMEN SOURCE, FCOV2M: NORMAL
SPECIMEN TYPE, XMCV1T: NORMAL

## 2020-11-12 NOTE — PROGRESS NOTES
ACM attempted to reach patient and/or emergency contact for ER follow-up. Unable to reach patient and left  for return call with contact information provided. 10:00 AM  Patient contacted regarding recent discharge and COVID-19 risk. Discussed COVID-19 related testing which was pending at this time. Test results were pending. Patient informed of results, if available? no    Care Transition Nurse/ Ambulatory Care Manager/ LPN Care Coordinator contacted the patient by telephone to perform post discharge assessment. Verified name and  with patient as identifiers. Patient has following risk factors of: no known risk factors. CTN/ACM/LPN reviewed discharge instructions, medical action plan and red flags related to discharge diagnosis. Reviewed and educated them on any new and changed medications related to discharge diagnosis. Advised obtaining a 90-day supply of all daily and as-needed medications. Advance Care Planning:   Does patient have an Advance Directive: health care decision makers updated    Education provided regarding infection prevention, and signs and symptoms of COVID-19 and when to seek medical attention with patient who verbalized understanding. Discussed exposure protocols and quarantine from [address] [place], [place] [address]y you at higher risk for severe illness  and given an opportunity for questions and concerns. The patient agrees to contact the COVID-19 hotline [phone] or PCP office for questions related to their healthcare. CTN/ACM/LPN provided contact information for future reference. From CDC: Are you at higher risk for severe illness?  Wash your hands often.  Avoid close contact (6 feet, which is about two arm lengths) with people who are sick.  Put distance between yourself and other people if COVID-19 is spreading in your community.  Clean and disinfect frequently touched surfaces.  Avoid all cruise travel and non-essential air travel.    Call your healthcare professional if you have concerns about COVID-19 and your underlying condition or if you are sick. For more information on steps you can take to protect yourself, see CDC's How to Protect Yourself      Patient/family/caregiver given information for GetWell Loop and agrees to enroll yes  Patient's preferred e-mail:  n/a  Patient's preferred phone number: [phone]  Based on Loop alert triggers, patient will be contacted by nurse care manager for worsening symptoms. Pt will be further monitored by COVID Loop Team based on severity of symptoms and risk factors. Patient reports having some weakness/ achiness today and some sweats but hasnt checked his temp and doesn't think he has fever.  Offered to assist in establishing a PCP and patient declined

## 2022-03-19 PROBLEM — R10.9 ABDOMINAL PAIN: Status: ACTIVE | Noted: 2018-11-25

## 2022-03-20 PROBLEM — R50.9 FEVER: Status: ACTIVE | Noted: 2018-11-29

## 2022-03-20 PROBLEM — F15.10 AMPHETAMINE ABUSE (HCC): Status: ACTIVE | Noted: 2018-11-29

## 2022-09-24 ENCOUNTER — APPOINTMENT (OUTPATIENT)
Dept: CT IMAGING | Age: 42
End: 2022-09-24
Attending: EMERGENCY MEDICINE
Payer: COMMERCIAL

## 2022-09-24 ENCOUNTER — HOSPITAL ENCOUNTER (EMERGENCY)
Age: 42
Discharge: HOME | End: 2022-09-24
Attending: EMERGENCY MEDICINE
Payer: COMMERCIAL

## 2022-09-24 VITALS
TEMPERATURE: 97.8 F | DIASTOLIC BLOOD PRESSURE: 102 MMHG | RESPIRATION RATE: 18 BRPM | HEART RATE: 96 BPM | OXYGEN SATURATION: 100 % | SYSTOLIC BLOOD PRESSURE: 142 MMHG
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
## 2022-09-24 DIAGNOSIS — S06.0X1A CONCUSSION WITH LOSS OF CONSCIOUSNESS OF 30 MINUTES OR LESS, INITIAL ENCOUNTER: Primary | ICD-10-CM

## 2022-09-24 PROCEDURE — 99284 EMERGENCY DEPT VISIT MOD MDM: CPT

## 2022-09-24 PROCEDURE — 72125 CT NECK SPINE W/O DYE: CPT

## 2022-09-24 PROCEDURE — 70450 CT HEAD/BRAIN W/O DYE: CPT

## 2022-09-24 NOTE — Clinical Note
[facility] EMERGENCY CTR  [address]  [address]  [phone]    Work/School Note    Date: 9/24/2022    To Whom It May concern:    Liban Barlow was seen and treated today in the emergency room by the following provider(s):  Attending Provider: Zarina Cuellar MD.      Liban Barlow is excused from work/school on 9/24/2022 through 9/26/2022. He is medically clear to return to work/school on 9/27/2022.          Sincerely,          Courtney Chowdhury RN

## 2022-09-24 NOTE — ED PROVIDER NOTES
41-year-old male with history of left kidney donation presents to the emergency department after fall from a tree stand of approximately 20 feet. Does not recall the event and was amnestic afterwards to situation, did not recall his wife was at work, did not recall she had a job. He denies any complaints except for some neck pain. The history is provided by the patient, medical records and the spouse. Fall  The accident occurred 1 to 2 hours ago. He fell from a height of 21 - 50 ft. He landed on [place]. There was no blood loss. The patient is experiencing no pain. He was Ambulatory at the scene. There was No drug use involved in the accident. Associated symptoms include loss of consciousness. Pertinent negatives include no fever, no abdominal pain, no nausea, no vomiting and no headaches. No past medical history on file. Past Surgical History:   Procedure Laterality Date    HX NEPHROSTOMY      HX RENAL TRANSPLANT      HX UROLOGICAL      left kidney removed  - donated    HX VASECTOMY           No family history on file. Social History     Socioeconomic History    Marital status: LEGALLY      Spouse name: Not on file    Number of children: Not on file    Years of education: Not on file    Highest education level: Not on file   Occupational History    Not on file   Tobacco Use    Smoking status: Never    Smokeless tobacco: Never   Substance and Sexual Activity    Alcohol use:  Yes     Alcohol/week: 20.0 standard drinks     Types: 20 Cans of beer per week    Drug use: No    Sexual activity: Not on file   Other Topics Concern    Not on file   Social History Narrative    Not on file     Social Determinants of Health     Financial Resource Strain: Not on file   Food Insecurity: Not on file   Transportation Needs: Not on file   Physical Activity: Not on file   Stress: Not on file   Social Connections: Not on file   Intimate Partner Violence: Not on file   Housing Stability: Not on file         ALLERGIES: Patient has no known allergies. Review of Systems   Constitutional:  Negative for fatigue and fever. HENT:  Negative for sneezing and sore throat. Respiratory:  Negative for cough and shortness of breath. Cardiovascular:  Negative for chest pain and leg swelling. Gastrointestinal:  Negative for abdominal pain, diarrhea, nausea and vomiting. Genitourinary:  Negative for difficulty urinating and dysuria. Musculoskeletal:  Negative for arthralgias and myalgias. Skin:  Negative for color change and rash. Neurological:  Positive for loss of consciousness. Negative for weakness and headaches. Psychiatric/Behavioral:  Negative for agitation and behavioral problems. Vitals:    09/24/22 1522   BP: (!) 115/104   Pulse: 98   Resp: 18   Temp: 97.4 °F (36.3 °C)   SpO2: 98%            Physical Exam  Vitals and nursing note reviewed. Constitutional:       General: He is not in acute distress. Appearance: Normal appearance. He is well-developed. He is not ill-appearing, toxic-appearing or diaphoretic. HENT:      Head: Normocephalic and atraumatic. Nose: Nose normal.      Mouth/Throat:      Mouth: Mucous membranes are moist.      Pharynx: Oropharynx is clear. Eyes:      Extraocular Movements: Extraocular movements intact. Conjunctiva/sclera: Conjunctivae normal.      Pupils: Pupils are equal, round, and reactive to light. Cardiovascular:      Rate and Rhythm: Normal rate and regular rhythm. Pulses: Normal pulses. Heart sounds: No murmur heard. Pulmonary:      Effort: Pulmonary effort is normal. No respiratory distress. Breath sounds: Normal breath sounds. No wheezing. Chest:      Chest wall: No mass or tenderness. Abdominal:      General: There is no distension. Palpations: Abdomen is soft. Tenderness: There is no abdominal tenderness. There is no guarding or rebound.    Musculoskeletal:         General: No swelling, tenderness, deformity or signs of injury. Normal range of motion. Cervical back: Normal range of motion and neck supple. No rigidity. No muscular tenderness. Right lower leg: No tenderness. No edema. Left lower leg: No tenderness. No edema. Skin:     General: Skin is warm and dry. Capillary Refill: Capillary refill takes less than 2 seconds. Neurological:      General: No focal deficit present. Mental Status: He is alert and oriented to person, place, and time. Psychiatric:         Mood and Affect: Mood normal.         Behavior: Behavior normal.        MDM  Number of Diagnoses or Management Options  Diagnosis management comments: 19-year-old male presents as above after fall from a tree stand. Imaging is reassuring in the emergency department without evidence of ICH or cervical injury. Patient has suffered a concussion. He is safe to be discharged in the care of his wife with concussion instructions, follow-up with primary care, return if needed.        Amount and/or Complexity of Data Reviewed  Tests in the radiology section of CPT®: reviewed           Procedures

## 2022-09-24 NOTE — ED NOTES
Pt ambulatory out of ED with discharge instructions and prescriptions in hand given by Dr. Raynald Dance; pt verbalized understanding of discharge paperwork and time allotted for questions. VSS. Pt alert and oriented. Pt accompanied by family.

## 2022-09-24 NOTE — ED TRIAGE NOTES
Pt reports he fell out of a tree about 1 hour ago from about 20 feet up the tree. Pt reports posterior head pain, neck pain and bilateral shoulder pain POA. Pt reports dizziness and nausea at this time. Spouse reports she contacted family that was with him during the fall and reports he did have positive LOC for approximately 2 minutes.

## 2022-09-24 NOTE — Clinical Note
[facility]  [address]  [address]  [phone]    Work/School Note    Date: 9/24/2022    To Whom It May concern:    Shakila Womack was seen and treated today in the emergency room by the following provider(s):  Attending Provider: Daron Beckford MD.      Shakila Womack is excused from work/school on 9/24/2022 through 9/26/2022. He is medically clear to return to work/school on 9/27/2022.          Sincerely,          Leticia Browne MD

## 2022-10-03 ENCOUNTER — HOSPITAL ENCOUNTER (EMERGENCY)
Age: 42
Discharge: HOME | End: 2022-10-04
Attending: EMERGENCY MEDICINE
Payer: COMMERCIAL

## 2022-10-03 VITALS
OXYGEN SATURATION: 97 % | DIASTOLIC BLOOD PRESSURE: 86 MMHG | HEART RATE: 89 BPM | RESPIRATION RATE: 17 BRPM | SYSTOLIC BLOOD PRESSURE: 134 MMHG | WEIGHT: 205.03 LBS | TEMPERATURE: 97.8 F | BODY MASS INDEX: 32.11 KG/M2

## 2022-10-03 DIAGNOSIS — F07.81 POST CONCUSSION SYNDROME: ICD-10-CM

## 2022-10-03 DIAGNOSIS — M25.522 LEFT ELBOW PAIN: Primary | ICD-10-CM

## 2022-10-03 PROCEDURE — 99284 EMERGENCY DEPT VISIT MOD MDM: CPT

## 2022-10-03 RX ORDER — IBUPROFEN 400 MG/1
TABLET ORAL
COMMUNITY

## 2022-10-03 RX ORDER — ACETAMINOPHEN 500 MG
1000 TABLET ORAL ONCE
Status: COMPLETED | OUTPATIENT
Start: 2022-10-04 | End: 2022-10-04

## 2022-10-03 NOTE — Clinical Note
Ezra Coto was seen and treated in our emergency department on 10/3/2022.     He is excused from work until cleared by a Neurologist.    Candy Colbert MD

## 2022-10-04 ENCOUNTER — APPOINTMENT (OUTPATIENT)
Dept: GENERAL RADIOLOGY | Age: 42
End: 2022-10-04
Attending: EMERGENCY MEDICINE
Payer: COMMERCIAL

## 2022-10-04 ENCOUNTER — APPOINTMENT (OUTPATIENT)
Dept: CT IMAGING | Age: 42
End: 2022-10-04
Attending: EMERGENCY MEDICINE
Payer: COMMERCIAL

## 2022-10-04 PROCEDURE — 74011250637 HC RX REV CODE- 250/637: Performed by: EMERGENCY MEDICINE

## 2022-10-04 PROCEDURE — 70450 CT HEAD/BRAIN W/O DYE: CPT

## 2022-10-04 PROCEDURE — 73080 X-RAY EXAM OF ELBOW: CPT

## 2022-10-04 RX ADMIN — ACETAMINOPHEN 1000 MG: 500 TABLET ORAL at 00:02

## 2022-10-04 NOTE — ED PROVIDER NOTES
The history is provided by the patient and a friend. Elbow Pain   This is a new problem. The current episode started more than 1 week ago. The problem occurs constantly. The problem has not changed since onset. The pain is present in the left elbow. The quality of the pain is described as aching. The pain is moderate. Associated symptoms include limited range of motion. Pertinent negatives include no numbness, no stiffness, no tingling, no itching, no back pain and no neck pain. The symptoms are aggravated by palpation. He has tried OTC pain medications for the symptoms. The treatment provided mild relief. There has been a history of trauma. Past Medical History:   Diagnosis Date    Head injury        Past Surgical History:   Procedure Laterality Date    HX NEPHROSTOMY      HX RENAL TRANSPLANT      HX UROLOGICAL      left kidney removed  - donated    HX VASECTOMY           History reviewed. No pertinent family history. Social History     Socioeconomic History    Marital status: LEGALLY      Spouse name: Not on file    Number of children: Not on file    Years of education: Not on file    Highest education level: Not on file   Occupational History    Not on file   Tobacco Use    Smoking status: Never    Smokeless tobacco: Never   Substance and Sexual Activity    Alcohol use: Yes     Alcohol/week: 20.0 standard drinks     Types: 20 Cans of beer per week    Drug use: No    Sexual activity: Not on file   Other Topics Concern    Not on file   Social History Narrative    Not on file     Social Determinants of Health     Financial Resource Strain: Not on file   Food Insecurity: Not on file   Transportation Needs: Not on file   Physical Activity: Not on file   Stress: Not on file   Social Connections: Not on file   Intimate Partner Violence: Not on file   Housing Stability: Not on file         ALLERGIES: Patient has no known allergies.     Review of Systems   Constitutional:  Negative for activity change, chills and fever. HENT:  Positive for tinnitus. Negative for nosebleeds, sore throat, trouble swallowing and voice change. Eyes:  Positive for visual disturbance. Respiratory:  Negative for shortness of breath. Cardiovascular:  Negative for chest pain and palpitations. Gastrointestinal:  Positive for nausea. Negative for abdominal pain, constipation and diarrhea. Genitourinary:  Negative for difficulty urinating, dysuria, hematuria and urgency. Musculoskeletal:  Positive for arthralgias. Negative for back pain, neck pain, neck stiffness and stiffness. Skin:  Negative for color change and itching. Allergic/Immunologic: Negative for immunocompromised state. Neurological:  Positive for dizziness and headaches. Negative for tingling, seizures, syncope, weakness, light-headedness and numbness. Psychiatric/Behavioral:  Negative for behavioral problems, confusion, hallucinations, self-injury and suicidal ideas. Vitals:    10/03/22 2346   BP: 134/86   Pulse: 89   Resp: 17   Temp: 97.8 °F (36.6 °C)   SpO2: 97%   Weight: 93 kg (205 lb 0.4 oz)            Physical Exam  Vitals and nursing note reviewed. Constitutional:       General: He is not in acute distress. Appearance: He is well-developed. He is not diaphoretic. HENT:      Head: Atraumatic. Neck:      Trachea: No tracheal deviation. Cardiovascular:      Comments: Warm and well perfused  Pulmonary:      Effort: Pulmonary effort is normal. No respiratory distress. Musculoskeletal:      Left elbow: Tenderness present in radial head. Skin:     General: Skin is warm and dry. Neurological:      Mental Status: He is alert and oriented to person, place, and time. GCS: GCS eye subscore is 4. GCS verbal subscore is 5. GCS motor subscore is 6. Cranial Nerves: Cranial nerves 2-12 are intact. Sensory: Sensation is intact. Motor: Motor function is intact.       Coordination: Coordination normal. Psychiatric:         Behavior: Behavior normal.         Thought Content: Thought content normal.         Judgment: Judgment normal.        MDM    This is a 26-year-old male with past medical history, review of systems, physical exam as above, presenting with complaints of ongoing headache, nausea, dizziness, tinnitus in the setting of recent fall and head injury. He also complains of ongoing left elbow pain. He allows his girlfriend provides majority the history, which reflects note from this emergency department approximately 1 week ago, where the patient was noted to a fall on from a tree  the [place] approximately 20 feet. He had CT of the head and cervical spine here in the emergency department which noted no acute findings, however patient continues to experience symptoms as above. Physical exam is remarkable for well-appearing middle-age male, in no acute distress noted to be normotensive, afebrile without tachycardia, satting well on room air. He has a nonfocal neurologic exam, equal reactive pupils, extraocular movements intact. Tenderness over the radial head of the left elbow, without crepitus, ecchymosis, swelling or decreased range of motion. Patient states he is right-hand dominant. Given mechanism, plan to repeat CT imaging of the brain, provide pain control, plain films of the left elbow. We will reassess, and make a disposition.     Procedures

## 2022-10-04 NOTE — ED TRIAGE NOTES
Pt reports with dizziness, left elbow pain and ringing in both ears after falling from a tree some days ago and landed on the back of his head. He also reports blurry vision peripherally.

## 2024-11-06 ENCOUNTER — HOSPITAL ENCOUNTER (EMERGENCY)
Facility: HOSPITAL | Age: 44
Discharge: HOME | End: 2024-11-06
Attending: EMERGENCY MEDICINE
Payer: COMMERCIAL

## 2024-11-06 VITALS
OXYGEN SATURATION: 97 % | HEART RATE: 104 BPM | SYSTOLIC BLOOD PRESSURE: 148 MMHG | TEMPERATURE: 98.2 F | RESPIRATION RATE: 18 BRPM | DIASTOLIC BLOOD PRESSURE: 93 MMHG | WEIGHT: 206.57 LBS

## 2024-11-06 DIAGNOSIS — S61.210A LACERATION OF RIGHT INDEX FINGER WITHOUT FOREIGN BODY WITHOUT DAMAGE TO NAIL, INITIAL ENCOUNTER: Primary | ICD-10-CM

## 2024-11-06 PROCEDURE — 99283 EMERGENCY DEPT VISIT LOW MDM: CPT

## 2024-11-06 PROCEDURE — 12001 RPR S/N/AX/GEN/TRNK 2.5CM/<: CPT

## 2024-11-06 PROCEDURE — 6370000000 HC RX 637 (ALT 250 FOR IP): Performed by: EMERGENCY MEDICINE

## 2024-11-06 RX ADMIN — Medication 3 ML: at 20:12

## 2024-11-06 ASSESSMENT — PAIN SCALES - GENERAL: PAINLEVEL_OUTOF10: 3

## 2024-11-06 ASSESSMENT — PAIN DESCRIPTION - ORIENTATION: ORIENTATION: RIGHT

## 2024-11-06 ASSESSMENT — PAIN DESCRIPTION - LOCATION: LOCATION: FINGER (COMMENT WHICH ONE)

## 2024-11-06 ASSESSMENT — PAIN - FUNCTIONAL ASSESSMENT: PAIN_FUNCTIONAL_ASSESSMENT: 0-10

## 2024-11-07 NOTE — ED PROVIDER NOTES
[facility]  EMERGENCY DEPARTMENT ENCOUNTER      Pt Name: Scot Rangel  MRN: [id number]  Birthdate 1980  Date of evaluation: 11/6/2024  Provider: Yuliya Calvillo DO    CHIEF COMPLAINT       Chief Complaint   Patient presents with    Laceration         HISTORY OF PRESENT ILLNESS   (Location/Symptom, Timing/Onset, Context/Setting, Quality, Duration, Modifying Factors, Severity)  Note limiting factors.   HPI      Review of External Medical Records:     Nursing Notes were reviewed.    REVIEW OF SYSTEMS    (2-9 systems for level 4, 10 or more for level 5)     Review of Systems    Except as noted above the remainder of the review of systems was reviewed and negative.       PAST MEDICAL HISTORY     Past Medical History:   Diagnosis Date    Head injury          SURGICAL HISTORY       Past Surgical History:   Procedure Laterality Date    KIDNEY TRANSPLANT      NEPHROSTOMY      UROLOGICAL SURGERY      left kidney removed  - donated    VASECTOMY           CURRENT MEDICATIONS       Previous Medications    IBUPROFEN (ADVIL;MOTRIN) 400 MG TABLET    Take by mouth every 6 hours as needed       ALLERGIES     Patient has no known allergies.    FAMILY HISTORY     History reviewed. No pertinent family history.       SOCIAL HISTORY       Social History     Socioeconomic History    Marital status: Legally      Spouse name: None    Number of children: None    Years of education: None    Highest education level: None   Tobacco Use    Smoking status: Never    Smokeless tobacco: Never   Substance and Sexual Activity    Alcohol use: Yes     Alcohol/week: 20.0 standard drinks of alcohol    Drug use: No           PHYSICAL EXAM    (up to 7 for level 4, 8 or more for level 5)     ED Triage Vitals [11/06/24 1930]   BP Systolic BP Percentile Diastolic BP Percentile Temp Temp Source Pulse Respirations SpO2   (!) 152/103 -- -- 98.2 °F (36.8 °C) Oral (!) 104 18 97 %      Height Weight - Scale         -- 93.7 kg (206 lb

## 2024-11-07 NOTE — ED NOTES
Verbal shift change report given to Jamila (oncoming nurse) by Nelly (offgoing nurse). Report included the following information Nurse Handoff Report and ED Encounter Summary. Waiting on MD evaluation.

## 2024-11-07 NOTE — ED TRIAGE NOTES
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 Believe tetanus within last five years.

## 2024-11-07 NOTE — ED NOTES
D/C per orders, ambulated from ED without difficulty.  Lauren placed bandage prior to departure.